# Patient Record
Sex: MALE | Race: WHITE | Employment: PART TIME | URBAN - METROPOLITAN AREA
[De-identification: names, ages, dates, MRNs, and addresses within clinical notes are randomized per-mention and may not be internally consistent; named-entity substitution may affect disease eponyms.]

---

## 2017-02-07 ENCOUNTER — OFFICE VISIT (OUTPATIENT)
Dept: URGENT CARE | Facility: CLINIC | Age: 37
End: 2017-02-07
Payer: COMMERCIAL

## 2017-02-07 VITALS
RESPIRATION RATE: 15 BRPM | BODY MASS INDEX: 39.17 KG/M2 | HEART RATE: 77 BPM | TEMPERATURE: 97.5 F | SYSTOLIC BLOOD PRESSURE: 92 MMHG | OXYGEN SATURATION: 96 % | DIASTOLIC BLOOD PRESSURE: 68 MMHG | WEIGHT: 315 LBS | HEIGHT: 75 IN

## 2017-02-07 DIAGNOSIS — J40 BRONCHITIS: Primary | ICD-10-CM

## 2017-02-07 DIAGNOSIS — J02.9 PHARYNGITIS, UNSPECIFIED ETIOLOGY: ICD-10-CM

## 2017-02-07 DIAGNOSIS — J01.40 ACUTE NON-RECURRENT PANSINUSITIS: ICD-10-CM

## 2017-02-07 DIAGNOSIS — J06.9 URI WITH COUGH AND CONGESTION: ICD-10-CM

## 2017-02-07 LAB
INT CON NEG: NEGATIVE
INT CON POS: POSITIVE
S PYO AG THROAT QL: NEGATIVE

## 2017-02-07 PROCEDURE — 87880 STREP A ASSAY W/OPTIC: CPT | Performed by: PHYSICIAN ASSISTANT

## 2017-02-07 PROCEDURE — 99214 OFFICE O/P EST MOD 30 MIN: CPT | Performed by: PHYSICIAN ASSISTANT

## 2017-02-07 RX ORDER — IBUPROFEN 200 MG
200 TABLET ORAL EVERY 6 HOURS PRN
COMMUNITY
End: 2021-03-18

## 2017-02-07 RX ORDER — PROMETHAZINE HYDROCHLORIDE AND CODEINE PHOSPHATE 6.25; 1 MG/5ML; MG/5ML
5 SYRUP ORAL 4 TIMES DAILY PRN
Qty: 240 ML | Refills: 0 | Status: SHIPPED | OUTPATIENT
Start: 2017-02-07 | End: 2017-02-21

## 2017-02-07 RX ORDER — SULFAMETHOXAZOLE AND TRIMETHOPRIM 800; 160 MG/1; MG/1
1 TABLET ORAL 2 TIMES DAILY
Qty: 20 TAB | Refills: 0 | Status: SHIPPED | OUTPATIENT
Start: 2017-02-07 | End: 2017-02-17

## 2017-02-07 ASSESSMENT — COPD QUESTIONNAIRES: COPD: 0

## 2017-02-07 ASSESSMENT — ENCOUNTER SYMPTOMS: COUGH: 1

## 2017-02-07 NOTE — PROGRESS NOTES
Subjective:      Pt is a 36 y.o. male who presents with Cough            Cough  This is a new problem. The current episode started in the past 7 days. The problem has been gradually worsening. The problem occurs constantly. The cough is productive of purulent sputum. The symptoms are aggravated by cold air, exercise and lying down. He has tried OTC cough suppressant for the symptoms. The treatment provided no relief. His past medical history is significant for bronchitis. There is no history of asthma, bronchiectasis, COPD or emphysema.   PT presents to  clinic today complaining of sore throat, fevers, chills, watery eyes, pressure in ears, cough, fatigue, runny nose, wheezing and SOB. PT denies CP, NVD, abdominal pain, joint pain. PT states these symptoms began around 2 days ago and that the pt's family has been sick on and off for the last week. Pt has not taken any medications for this condition. PT states the pain is a 7/10 with coughing, aching in nature and worse at night.  The pt's medication list, problem list, and allergies have been evaluated and reviewed during today's visit.    PMH:  Negative per pt.      PSH:  Negative per pt.      Fam Hx:  Father with hx of HTN      Soc HX:  Social History     Social History   • Marital Status:      Spouse Name: N/A   • Number of Children: N/A   • Years of Education: N/A     Occupational History   • Not on file.     Social History Main Topics   • Smoking status: Never Smoker    • Smokeless tobacco: Never Used      Comment: many years of second hand smoke exposure growing up in a smoking household.    • Alcohol Use: No   • Drug Use: No   • Sexual Activity: Not on file     Other Topics Concern   • Not on file     Social History Narrative         Medications:    Current outpatient prescriptions:   •  ibuprofen (MOTRIN) 200 MG Tab, Take 200 mg by mouth every 6 hours as needed., Disp: , Rfl:   •  vitamin D (CHOLECALCIFEROL) 1000 UNIT Tab, Take 1,000 Units by mouth  "every day., Disp: , Rfl:   •  Multiple Vitamins-Minerals (MULTI FOR HIM PO), Take  by mouth., Disp: , Rfl:   •  asa/apap/caffeine (EXCEDRIN) 250-250-65 MG Tab, Take 1 Tab by mouth every 6 hours as needed for Headache., Disp: , Rfl:   •  methylPREDNISolone (MEDROL DOSPACK) 4 MG TABS, Use as directed., Disp: 21 Tab, Rfl: 0  •  hydrocod polst-chlorphen polst (TUSSIONEX) 10-8 MG/5ML LQCR, Take 5 mL by mouth every 12 hours., Disp: 140 mL, Rfl: 1  •  azithromycin (ZITHROMAX) 250 MG TABS, Take 2 tablets PO on day one, then 1 tablet PO on day two to five., Disp: 6 Tab, Rfl: 0  •  benzonatate (TESSALON) 200 MG capsule, Take 1 Cap by mouth 3 times a day as needed for Cough., Disp: 60 Cap, Rfl: 0      Allergies:  Orange and Penicillins        Review of Systems   Respiratory: Positive for cough.    Constitutional: Positive for chills and malaise/fatigue. Negative for fever and diaphoresis.   HENT: Positive for congestion, ear pain and sore throat. Negative for ear discharge, hearing loss, nosebleeds and tinnitus.    Eyes: Negative for blurred vision, double vision and photophobia.   Respiratory continued: Positive for cough, sputum production, shortness of breath and wheezing. Negative for hemoptysis.    Cardiovascular: Negative for chest pain and palpitations.   Gastrointestinal: Negative for nausea, vomiting, abdominal pain, diarrhea and constipation.   Genitourinary: Negative for dysuria and flank pain.   Musculoskeletal: Negative for joint pain and myalgias.   Skin: Negative for itching and rash.   Neurological: Positive for headaches. Negative for dizziness, tingling and weakness.   Endo/Heme/Allergies: Does not bruise/bleed easily.   Psychiatric/Behavioral: Negative for depression. The patient is not nervous/anxious.    All other systems reviewed and are negative.           Objective:     BP 92/68 mmHg  Pulse 77  Temp(Src) 36.4 °C (97.5 °F)  Resp 15  Ht 1.905 m (6' 3\")  Wt 145.605 kg (321 lb)  BMI 40.12 kg/m2  SpO2 " 96%     Physical Exam       Physical Exam   Constitutional: PT is oriented to person, place, and time. PT appears well-developed and well-nourished. No distress.   HENT:   Head: Normocephalic and atraumatic.   Right Ear: Hearing, tympanic membrane, external ear and ear canal normal.   Left Ear: Hearing, tympanic membrane, external ear and ear canal normal.   Nose: Mucosal edema, rhinorrhea and sinus tenderness present. Right sinus exhibits frontal sinus tenderness. Left sinus exhibits frontal sinus tenderness.   Mouth/Throat: Uvula is midline. Mucous membranes are pale. Posterior oropharyngeal edema and posterior oropharyngeal erythema present. No oropharyngeal exudate.   Eyes: Conjunctivae normal and EOM are normal. Pupils are equal, round, and reactive to light. Right eye exhibits no discharge. Left eye exhibits no discharge.   Neck: Normal range of motion. Neck supple. No thyromegaly present.   Cardiovascular: Normal rate, regular rhythm, normal heart sounds and intact distal pulses.  Exam reveals no gallop and no friction rub.    No murmur heard.  Pulmonary/Chest: Effort normal. No respiratory distress. PT has wheezes. PT has no rales. PT exhibits tenderness.   Abdominal: Soft. Bowel sounds are normal. PT exhibits no distension and no mass. There is no tenderness. There is no rebound and no guarding.   Musculoskeletal: Normal range of motion. PT exhibits no edema and no tenderness.   Lymphadenopathy:     PT has no cervical adenopathy.   Neurological: Pt is alert and oriented to person, place, and time. Pt has normal reflexes. No cranial nerve deficit.   Skin: Skin is warm and dry. No rash noted. No erythema.   Psychiatric: PT has a normal mood and affect. Pt behavior is normal. Judgment and thought content normal.        Assessment/Plan:     1. Bronchitis    - sulfamethoxazole-trimethoprim (BACTRIM DS) 800-160 MG tablet; Take 1 Tab by mouth 2 times a day for 10 days.  Dispense: 20 Tab; Refill: 0    2. Acute  non-recurrent pansinusitis    - sulfamethoxazole-trimethoprim (BACTRIM DS) 800-160 MG tablet; Take 1 Tab by mouth 2 times a day for 10 days.  Dispense: 20 Tab; Refill: 0    3. Pharyngitis, unspecified etiology    - POCT Rapid Strep A-->NEG    4. URI with cough and congestion    - promethazine-codeine (PHENERGAN-CODEINE) 6.25-10 MG/5ML Syrup; Take 5 mL by mouth 4 times a day as needed for up to 14 days.  Dispense: 240 mL; Refill: 0    Rest, fluids encouraged.  OTC decongestant for congestion/cough  Note given for work.  AVS with medical info given.  Pt was in full understanding and agreement with the plan.  Follow-up as needed if symptoms worsen or fail to improve.

## 2017-02-07 NOTE — MR AVS SNAPSHOT
"        Edwardo TIM Ezequiel   2017 3:30 PM   Office Visit   MRN: [id number]    Department:  [facility]   Dept Phone:  [phone]    Description:  Male : 1980   Provider:  Goran Barlow PA-C           Reason for Visit     Cough runny nose, cough with tan/green phlegm, ears hurt, congestion, feeling of fluid in lungs, sore throat x2days      Allergies as of 2017     Allergen Noted Reactions    [place] 2014   Shortness of Breath    Penicillins 2014       Family Hx of allergy      You were diagnosed with     Bronchitis   [[id number]]  -  Primary     Acute non-recurrent pansinusitis   [[id number]]       Pharyngitis, unspecified etiology   [[id number]]       URI with cough and congestion   [[id number]]         Vital Signs     Blood Pressure Pulse Temperature Respirations Height Weight    92/68 mmHg 77 36.4 °C (97.5 °F) 15 1.905 m (6' 3\") 145.605 kg (321 lb)    Body Mass Index Oxygen Saturation Smoking Status             40.12 kg/m2 96% Never Smoker          Basic Information     Date Of Birth Sex Race Ethnicity Preferred Language    1980 Male White Unknown English      Health Maintenance        Date Due Completion Dates    IMM DTaP/Tdap/Td Vaccine (1 - Tdap) 2000 ---    IMM INFLUENZA (1) 2016 ---            Results     POCT Rapid Strep A      Component    Rapid Strep Screen    NEGATIVE    Internal Control Positive    Positive    Internal Control Negative    Negative                        Current Immunizations     No immunizations on file.      Below and/or attached are the medications your provider expects you to take. Review all of your home medications and newly ordered medications with your provider and/or pharmacist. Follow medication instructions as directed by your provider and/or pharmacist. Please keep your medication list with you and share with your provider. Update the information when medications are discontinued, doses are changed, or new medications (including " over-the-counter products) are added; and carry medication information at all times in the event of emergency situations     Allergies:  ORANGE - Shortness of Breath     PENICILLINS - (reactions not documented)               Medications  Valid as of: February 07, 2017 -  4:26 PM    Generic Name Brand Name Tablet Size Instructions for use    Aspirin-Acetaminophen-Caffeine (Tab) EXCEDRIN 250-250-65 MG Take 1 Tab by mouth every 6 hours as needed for Headache.        Azithromycin (Tab) ZITHROMAX 250 MG Take 2 tablets PO on day one, then 1 tablet PO on day two to five.        Benzonatate (Cap) TESSALON 200 MG Take 1 Cap by mouth 3 times a day as needed for Cough.        Cholecalciferol (Tab) cholecalciferol 1000 UNIT Take 1,000 Units by mouth every day.        Hydrocod Polst-Chlorphen Polst (Liquid CR) TUSSIONEX 10-8 MG/5ML Take 5 mL by mouth every 12 hours.        Ibuprofen (Tab) MOTRIN 200 MG Take 200 mg by mouth every 6 hours as needed.        MethylPREDNISolone (Tab) MEDROL DOSPACK 4 MG Use as directed.        Multiple Vitamins-Minerals   Take  by mouth.        Promethazine-Codeine (Syrup) PHENERGAN-CODEINE 6.25-10 MG/5ML Take 5 mL by mouth 4 times a day as needed for up to 14 days.        Sulfamethoxazole-Trimethoprim (Tab) BACTRIM -160 MG Take 1 Tab by mouth 2 times a day for 10 days.        .                 Medicines prescribed today were sent to:     [id number] - [place] - [address] [address]    [address] [facility] [address]    Phone: [phone] Fax: [phone]    Open 24 Hours?: No      Medication refill instructions:       If your prescription bottle indicates you have medication refills left, it is not necessary to call your provider’s office. Please contact your pharmacy and they will refill your medication.    If your prescription bottle indicates you do not have any refills left, you may request refills at any time through one of the following ways: The online [employer] system (except Urgent  Care), by calling your provider’s office, or by asking your pharmacy to contact your provider’s office with a refill request. Medication refills are processed only during regular business hours and may not be available until the next business day. Your provider may request additional information or to have a follow-up visit with you prior to refilling your medication.   *Please Note: Medication refills are assigned a new Rx number when refilled electronically. Your pharmacy may indicate that no refills were authorized even though a new prescription for the same medication is available at the pharmacy. Please request the medicine by name with the pharmacy before contacting your provider for a refill.        Instructions    Acute Bronchitis  Bronchitis is inflammation of the airways that extend from the windpipe into the lungs (bronchi). The inflammation often causes mucus to develop. This leads to a cough, which is the most common symptom of bronchitis.   In acute bronchitis, the condition usually develops suddenly and goes away over time, usually in a couple weeks. Smoking, allergies, and asthma can make bronchitis worse. Repeated episodes of bronchitis may cause further lung problems.   CAUSES  Acute bronchitis is most often caused by the same virus that causes a cold. The virus can spread from person to person (contagious) through coughing, sneezing, and touching contaminated objects.  SIGNS AND SYMPTOMS   · Cough.    · Fever.    · Coughing up mucus.    · Body aches.    · Chest congestion.    · Chills.    · Shortness of breath.    · Sore throat.    DIAGNOSIS   Acute bronchitis is usually diagnosed through a physical exam. Your health care provider will also ask you questions about your medical history. Tests, such as chest X-rays, are sometimes done to rule out other conditions.   TREATMENT   Acute bronchitis usually goes away in a couple weeks. Oftentimes, no medical treatment is necessary. Medicines are  sometimes given for relief of fever or cough. Antibiotic medicines are usually not needed but may be prescribed in certain situations. In some cases, an inhaler may be recommended to help reduce shortness of breath and control the cough. A cool mist vaporizer may also be used to help thin bronchial secretions and make it easier to clear the chest.   HOME CARE INSTRUCTIONS  · Get plenty of rest.    · Drink enough fluids to keep your urine clear or pale yellow (unless you have a medical condition that requires fluid restriction). Increasing fluids may help thin your respiratory secretions (sputum) and reduce chest congestion, and it will prevent dehydration.    · Take medicines only as directed by your health care provider.  · If you were prescribed an antibiotic medicine, finish it all even if you start to feel better.  · Avoid smoking and secondhand smoke. Exposure to cigarette smoke or irritating chemicals will make bronchitis worse. If you are a smoker, consider using nicotine gum or skin patches to help control withdrawal symptoms. Quitting smoking will help your lungs heal faster.    · Reduce the chances of another bout of acute bronchitis by washing your hands frequently, avoiding people with cold symptoms, and trying not to touch your hands to your mouth, nose, or eyes.    · Keep all follow-up visits as directed by your health care provider.    SEEK MEDICAL CARE IF:  Your symptoms do not improve after 1 week of treatment.   SEEK IMMEDIATE MEDICAL CARE IF:  · You develop an increased fever or chills.    · You have chest pain.    · You have severe shortness of breath.  · You have bloody sputum.    · You develop dehydration.  · You faint or repeatedly feel like you are going to pass out.  · You develop repeated vomiting.  · You develop a severe headache.  MAKE SURE YOU:   · Understand these instructions.  · Will watch your condition.  · Will get help right away if you are not doing well or get worse.     This  information is not intended to replace advice given to you by your health care provider. Make sure you discuss any questions you have with your health care provider.     Document Released: 01/25/2006 Document Revised: 01/08/2016 Document Reviewed: 06/10/2014  [employer] Interactive Patient Education ©2016 [employer] Inc.            mVisum Access Code: [email or web address]  Expires: 3/9/2017  3:50 PM    Your email address is not on file at [facility].  Email Addresses are required for you to sign up for mVisum, please contact [phone] to verify your personal information and to provide your email address prior to attempting to register for mVisum.    Edwardo Nieves  [address] [place] [address]    mVisum  A secure, online tool to manage your health information     [facility]’s mVisum® is a secure, online tool that connects you to your personalized health information from the privacy of your home -- day or night - making it very easy for you to manage your healthcare. Once the activation process is completed, you can even access your medical information using the mVisum flora, which is available for free in the Apple Flora store or Google Play store.     To learn more about mVisum, visit [email or web address]    There are two levels of access available (as shown below):   My Chart Features  [facility] Primary Care Doctor [facility]  Specialists [facility]  Urgent  Care [facility] Primary Care Doctor   Email your healthcare team securely and privately 24/7 X X X    Manage appointments: schedule your next appointment; view details of past/upcoming appointments X      Request prescription refills. X      View recent personal medical records, including lab and immunizations X X X X   View health record, including health history, allergies, medications X X X X   Read reports about your outpatient visits, procedures, consult and ER notes X X X X   See your discharge summary, which is a recap of your hospital and/or  ER visit that includes your diagnosis, lab results, and care plan X X  X     How to register for [employer]:  Once your e-mail address has been verified, follow the following steps to sign up for [employer].     1. Go to  [email or web address]  2. Click on the Sign Up Now box, which takes you to the New Member Sign Up page. You will need to provide the following information:  a. Enter your [employer] Access Code exactly as it appears at the top of this page. (You will not need to use this code after you’ve completed the sign-up process. If you do not sign up before the expiration date, you must request a new code.)   b. Enter your date of birth.   c. Enter your home email address.   d. Click Submit, and follow the next screen’s instructions.  3. Create a [employer] ID. This will be your [employer] login ID and cannot be changed, so think of one that is secure and easy to remember.  4. Create a [employer] password. You can change your password at any time.  5. Enter your Password Reset Question and Answer. This can be used at a later time if you forget your password.   6. Enter your e-mail address. This allows you to receive e-mail notifications when new information is available in [employer].  7. Click Sign Up. You can now view your health information.    For assistance activating your [employer] account, call [phone]

## 2017-02-07 NOTE — Clinical Note
February 7, 2017       Patient: Edwardo Nieves   YOB: 1980   Date of Visit: 2/7/2017         To Whom It May Concern:    It is my medical opinion that Edwardo Nieves may be excused from work for the dates of 2/7/17-2/9/17.      If you have any questions or concerns, please don't hesitate to call [phone]          Sincerely,          Goran Barlow PA-C  Electronically Signed

## 2017-02-07 NOTE — PATIENT INSTRUCTIONS
Acute Bronchitis  Bronchitis is inflammation of the airways that extend from the windpipe into the lungs (bronchi). The inflammation often causes mucus to develop. This leads to a cough, which is the most common symptom of bronchitis.   In acute bronchitis, the condition usually develops suddenly and goes away over time, usually in a couple weeks. Smoking, allergies, and asthma can make bronchitis worse. Repeated episodes of bronchitis may cause further lung problems.   CAUSES  Acute bronchitis is most often caused by the same virus that causes a cold. The virus can spread from person to person (contagious) through coughing, sneezing, and touching contaminated objects.  SIGNS AND SYMPTOMS   · Cough.    · Fever.    · Coughing up mucus.    · Body aches.    · Chest congestion.    · Chills.    · Shortness of breath.    · Sore throat.    DIAGNOSIS   Acute bronchitis is usually diagnosed through a physical exam. Your health care provider will also ask you questions about your medical history. Tests, such as chest X-rays, are sometimes done to rule out other conditions.   TREATMENT   Acute bronchitis usually goes away in a couple weeks. Oftentimes, no medical treatment is necessary. Medicines are sometimes given for relief of fever or cough. Antibiotic medicines are usually not needed but may be prescribed in certain situations. In some cases, an inhaler may be recommended to help reduce shortness of breath and control the cough. A cool mist vaporizer may also be used to help thin bronchial secretions and make it easier to clear the chest.   HOME CARE INSTRUCTIONS  · Get plenty of rest.    · Drink enough fluids to keep your urine clear or pale yellow (unless you have a medical condition that requires fluid restriction). Increasing fluids may help thin your respiratory secretions (sputum) and reduce chest congestion, and it will prevent dehydration.    · Take medicines only as directed by your health care provider.  · If  you were prescribed an antibiotic medicine, finish it all even if you start to feel better.  · Avoid smoking and secondhand smoke. Exposure to cigarette smoke or irritating chemicals will make bronchitis worse. If you are a smoker, consider using nicotine gum or skin patches to help control withdrawal symptoms. Quitting smoking will help your lungs heal faster.    · Reduce the chances of another bout of acute bronchitis by washing your hands frequently, avoiding people with cold symptoms, and trying not to touch your hands to your mouth, nose, or eyes.    · Keep all follow-up visits as directed by your health care provider.    SEEK MEDICAL CARE IF:  Your symptoms do not improve after 1 week of treatment.   SEEK IMMEDIATE MEDICAL CARE IF:  · You develop an increased fever or chills.    · You have chest pain.    · You have severe shortness of breath.  · You have bloody sputum.    · You develop dehydration.  · You faint or repeatedly feel like you are going to pass out.  · You develop repeated vomiting.  · You develop a severe headache.  MAKE SURE YOU:   · Understand these instructions.  · Will watch your condition.  · Will get help right away if you are not doing well or get worse.     This information is not intended to replace advice given to you by your health care provider. Make sure you discuss any questions you have with your health care provider.     Document Released: 01/25/2006 Document Revised: 01/08/2016 Document Reviewed: 06/10/2014  [employer] Interactive Patient Education ©2016 [employer]

## 2018-04-26 ENCOUNTER — OFFICE VISIT (OUTPATIENT)
Dept: URGENT CARE | Facility: CLINIC | Age: 38
End: 2018-04-26
Payer: COMMERCIAL

## 2018-04-26 VITALS
SYSTOLIC BLOOD PRESSURE: 100 MMHG | HEART RATE: 81 BPM | HEIGHT: 75 IN | TEMPERATURE: 98.6 F | OXYGEN SATURATION: 98 % | BODY MASS INDEX: 39.17 KG/M2 | RESPIRATION RATE: 15 BRPM | WEIGHT: 315 LBS | DIASTOLIC BLOOD PRESSURE: 82 MMHG

## 2018-04-26 DIAGNOSIS — J02.9 VIRAL PHARYNGITIS: ICD-10-CM

## 2018-04-26 DIAGNOSIS — G43.809 OTHER MIGRAINE WITHOUT STATUS MIGRAINOSUS, NOT INTRACTABLE: ICD-10-CM

## 2018-04-26 DIAGNOSIS — J02.9 SORE THROAT: ICD-10-CM

## 2018-04-26 LAB
INT CON NEG: NORMAL
INT CON POS: NORMAL
S PYO AG THROAT QL: NEGATIVE

## 2018-04-26 PROCEDURE — 99214 OFFICE O/P EST MOD 30 MIN: CPT | Performed by: NURSE PRACTITIONER

## 2018-04-26 PROCEDURE — 87880 STREP A ASSAY W/OPTIC: CPT | Performed by: NURSE PRACTITIONER

## 2018-04-26 PROCEDURE — 99999 PR NO CHARGE: CPT | Performed by: NURSE PRACTITIONER

## 2018-04-26 RX ORDER — DIPHENHYDRAMINE HYDROCHLORIDE 50 MG/ML
25 INJECTION INTRAMUSCULAR; INTRAVENOUS ONCE
Status: COMPLETED | OUTPATIENT
Start: 2018-04-26 | End: 2018-04-26

## 2018-04-26 RX ORDER — PROMETHAZINE HYDROCHLORIDE 25 MG/1
25 TABLET ORAL ONCE
Status: COMPLETED | OUTPATIENT
Start: 2018-04-26 | End: 2018-04-26

## 2018-04-26 RX ORDER — KETOROLAC TROMETHAMINE 30 MG/ML
60 INJECTION, SOLUTION INTRAMUSCULAR; INTRAVENOUS ONCE
Status: COMPLETED | OUTPATIENT
Start: 2018-04-26 | End: 2018-04-26

## 2018-04-26 RX ADMIN — PROMETHAZINE HYDROCHLORIDE 25 MG: 25 TABLET ORAL at 10:34

## 2018-04-26 RX ADMIN — KETOROLAC TROMETHAMINE 60 MG: 30 INJECTION, SOLUTION INTRAMUSCULAR; INTRAVENOUS at 10:40

## 2018-04-26 RX ADMIN — DIPHENHYDRAMINE HYDROCHLORIDE 25 MG: 50 INJECTION INTRAMUSCULAR; INTRAVENOUS at 10:42

## 2018-04-26 ASSESSMENT — ENCOUNTER SYMPTOMS
SWOLLEN GLANDS: 1
BLURRED VISION: 0
EYE REDNESS: 0
EYE DISCHARGE: 0
EYE PAIN: 1
COUGH: 0
MIGRAINE HEADACHES: 1
FACIAL SWEATING: 0
SCALP TENDERNESS: 0
EYE WATERING: 0
FEVER: 0
HEADACHES: 1
DOUBLE VISION: 0
SORE THROAT: 1
PHOTOPHOBIA: 1
SINUS PRESSURE: 0

## 2018-04-26 ASSESSMENT — PATIENT HEALTH QUESTIONNAIRE - PHQ9
CLINICAL INTERPRETATION OF PHQ2 SCORE: 4
SUM OF ALL RESPONSES TO PHQ QUESTIONS 1-9: 6
5. POOR APPETITE OR OVEREATING: 0 - NOT AT ALL

## 2018-04-26 ASSESSMENT — VISUAL ACUITY
OD_CC: 20/20
OS_CC: 20/20

## 2018-04-26 NOTE — LETTER
April 26, 2018         Patient: Edwardo Nieves   YOB: 1980   Date of Visit: 4/26/2018           To Whom it May Concern:    Edwardo Nieves was seen in my clinic on 4/26/2018. Please excuse him from school 4/26/18-4/27/18.        Sincerely,           EROS Strange.  Electronically Signed

## 2018-04-26 NOTE — PROGRESS NOTES
Subjective:      Edwardo Nieves is a 37 y.o. male who presents with Migraine (sensative to light, not able to speak, swollen lymph nodes, pressure on right,right eye and ear, sore throat, fever, x3days)            Migraine    This is a new problem. Episode onset: Pt presents today with c/o sore throat, right ear pain and migraine that developed 3 days ago. Rates throat pain 5/10, right eye and ear pain 7/10. He feels his vision is blurry. Denies fever, aches or chills. The problem has been unchanged. The pain is located in the right unilateral region. Similar to prior headaches: He has had headaches previously but this is the first time it is mainly on the right side. The quality of the pain is described as aching and shooting. The pain is moderate. Associated symptoms include ear pain (right), eye pain (right), photophobia, a sore throat and swollen glands. Pertinent negatives include no blurred vision, coughing, eye redness, eye watering, facial sweating, fever, muscle aches, phonophobia, scalp tenderness, sinus pressure or tinnitus. Treatments tried: pt reports he took one excedrin yesterday and throat lozenges throughout the day. The treatment provided no relief. His past medical history is significant for migraine headaches.       Review of Systems   Constitutional: Negative for fever and malaise/fatigue.   HENT: Positive for ear pain (right) and sore throat. Negative for congestion, sinus pressure and tinnitus.    Eyes: Positive for photophobia and pain (right). Negative for blurred vision, double vision, discharge and redness.   Respiratory: Negative for cough.    Neurological: Positive for headaches.   All other systems reviewed and are negative.    No past medical history on file. No past surgical history on file.   Social History     Social History   • Marital status:      Spouse name: N/A   • Number of children: N/A   • Years of education: N/A     Occupational History   • Not on file.     Social  "History Main Topics   • Smoking status: Never Smoker   • Smokeless tobacco: Never Used      Comment: many years of second hand smoke exposure growing up in a smoking household.    • Alcohol use No   • Drug use: No   • Sexual activity: Not on file     Other Topics Concern   • Not on file     Social History Narrative   • No narrative on file          Objective:     /82   Pulse 81   Temp 37 °C (98.6 °F)   Resp 15   Ht 1.905 m (6' 3\")   Wt (!) 147.4 kg (325 lb)   SpO2 98%   BMI 40.62 kg/m²      Physical Exam   Constitutional: He is oriented to person, place, and time. Vital signs are normal. He appears well-developed and well-nourished.   HENT:   Head: Normocephalic and atraumatic.   Right Ear: Tympanic membrane and external ear normal.   Left Ear: Tympanic membrane and external ear normal.   Nose: Nose normal.   Mouth/Throat: Posterior oropharyngeal erythema present. No oropharyngeal exudate or posterior oropharyngeal edema.   Eyes: Conjunctivae and EOM are normal. Pupils are equal, round, and reactive to light.   Visual acuity with glasses-   R- 20/20  L- 20/20  Both- 20/20   Neck: Normal range of motion.   Cardiovascular: Normal rate and regular rhythm.    Pulmonary/Chest: Effort normal.   Musculoskeletal: Normal range of motion.   Lymphadenopathy:        Head (right side): Submandibular adenopathy present.        Head (left side): Submandibular adenopathy present.   Neurological: He is alert and oriented to person, place, and time.   Skin: Skin is warm and dry. Capillary refill takes less than 2 seconds.   Psychiatric: He has a normal mood and affect. His speech is normal and behavior is normal. Thought content normal.   Vitals reviewed.              Assessment/Plan:     1. Sore throat  - POCT Rapid Strep A NEGATIVE    2. Viral pharyngitis  - mag hydrox-al hydrox-simeth-diphenhydrAMINE-lidocaine viscous 2%; Take 15 mL by mouth every 6 hours as needed.  Dispense: 300 mL; Refill: 0    3. Other migraine " without status migrainosus, not intractable  - ketorolac (TORADOL) injection 60 mg; 2 mL by Intramuscular route Once.  - diphenhydrAMINE (BENADRYL) injection 25 mg; 0.5 mL by Intramuscular route Once.  - promethazine (PHENERGAN) tablet 25 mg; Take 1 Tab by mouth Once.    Pt presents with his mother and he is writing on a notepad to communicate with me. He does not appear to be in any distress, his visual acuity is WNL and he is alert and oriented.  Given that he has taken very few OTC medications to help improve these symptoms, I have encouraged him to alternate tylenol and ibuprofen for headache and throat pain  Warm salt water gargles  Throat lozenges as directed  He wants a school note to excuse him for today and tomorrow, I will provide this   Both mother and patient understand POC and agree  If his visual complaints worsen or new worsening pain to right eye he needs to go to the ED, he understands  At this time I am not concerned for any acute IC abnormalities that would cause pain or affect his eye  RTC if ST not improving  Supportive care, differential diagnoses, and indications for immediate follow-up discussed with patient.    Pathogenesis of diagnosis discussed including typical length and natural progression.      Instructed to return to  or nearest emergency department if symptoms fail to improve, for any change in condition, further concerns, or new concerning symptoms.  Patient states understanding of the plan of care and discharge instructions.

## 2018-09-19 ENCOUNTER — OFFICE VISIT (OUTPATIENT)
Dept: MEDICAL GROUP | Facility: PHYSICIAN GROUP | Age: 38
End: 2018-09-19
Payer: COMMERCIAL

## 2018-09-19 VITALS
OXYGEN SATURATION: 98 % | TEMPERATURE: 97.8 F | WEIGHT: 302 LBS | DIASTOLIC BLOOD PRESSURE: 60 MMHG | BODY MASS INDEX: 37.55 KG/M2 | SYSTOLIC BLOOD PRESSURE: 100 MMHG | HEIGHT: 75 IN | HEART RATE: 58 BPM

## 2018-09-19 DIAGNOSIS — E66.9 OBESITY (BMI 35.0-39.9 WITHOUT COMORBIDITY): ICD-10-CM

## 2018-09-19 DIAGNOSIS — Z00.00 WELL ADULT EXAM: ICD-10-CM

## 2018-09-19 DIAGNOSIS — Z23 NEED FOR INFLUENZA VACCINATION: ICD-10-CM

## 2018-09-19 PROCEDURE — 90471 IMMUNIZATION ADMIN: CPT | Performed by: FAMILY MEDICINE

## 2018-09-19 PROCEDURE — 90686 IIV4 VACC NO PRSV 0.5 ML IM: CPT | Performed by: FAMILY MEDICINE

## 2018-09-19 PROCEDURE — 99385 PREV VISIT NEW AGE 18-39: CPT | Mod: 25 | Performed by: FAMILY MEDICINE

## 2018-09-19 ASSESSMENT — ENCOUNTER SYMPTOMS
CHILLS: 0
RESPIRATORY NEGATIVE: 1
GASTROINTESTINAL NEGATIVE: 1
DEPRESSION: 0
HEMOPTYSIS: 0
MYALGIAS: 0
DIZZINESS: 0
COUGH: 0
HEADACHES: 0
FEVER: 0
PALPITATIONS: 0
BLURRED VISION: 0
HEARTBURN: 0
CONSTITUTIONAL NEGATIVE: 1
CARDIOVASCULAR NEGATIVE: 1
NAUSEA: 0
PSYCHIATRIC NEGATIVE: 1
MUSCULOSKELETAL NEGATIVE: 1
BRUISES/BLEEDS EASILY: 0
TINGLING: 0
NEUROLOGICAL NEGATIVE: 1
DOUBLE VISION: 0
EYES NEGATIVE: 1

## 2018-09-19 NOTE — PROGRESS NOTES
Subjective:      Edwardo Nieves is a 38 y.o. male who presents with Annual Exam            New patient visit, need flu for HM   Still needs to lose weight but has made great progress down from 370 a year ago  Advised on diet and exercise    1. Well adult exam      2. Need for influenza vaccination    - Flu Quad Inj >3 Year Pre-Filled (Preservative Free)    3. Obesity (BMI 35.0-39.9 without comorbidity)    - Patient identified as having weight management issue.  Appropriate orders and counseling given.    History reviewed. No pertinent past medical history.  History reviewed. No pertinent surgical history.  Smoking status: Never Smoker                                                              Smokeless tobacco: Never Used                      Comment: many years of second hand smoke exposure            growing up in a smoking household.   Alcohol use: No              Review of patient's family history indicates:  Problem: Cancer      Relation: Mother       Age of Onset: (Not Specified)       Current Outpatient Prescriptions: •  mag hydrox-al hydrox-simeth-diphenhydrAMINE-lidocaine viscous 2%, Take 15 mL by mouth every 6 hours as needed., Disp: 300 mL, Rfl: 0•  ibuprofen (MOTRIN) 200 MG Tab, Take 200 mg by mouth every 6 hours as needed., Disp: , Rfl: •  vitamin D (CHOLECALCIFEROL) 1000 UNIT Tab, Take 1,000 Units by mouth every day., Disp: , Rfl: •  asa/apap/caffeine (EXCEDRIN) 250-250-65 MG Tab, Take 1 Tab by mouth every 6 hours as needed for Headache., Disp: , Rfl: •  Multiple Vitamins-Minerals (MULTI FOR HIM PO), Take  by mouth., Disp: , Rfl: •  methylPREDNISolone (MEDROL DOSPACK) 4 MG TABS, Use as directed., Disp: 21 Tab, Rfl: 0•  hydrocod polst-chlorphen polst (TUSSIONEX) 10-8 MG/5ML LQCR, Take 5 mL by mouth every 12 hours., Disp: 140 mL, Rfl: 1•  azithromycin (ZITHROMAX) 250 MG TABS, Take 2 tablets PO on day one, then 1 tablet PO on day two to five., Disp: 6 Tab, Rfl: 0•  benzonatate (TESSALON) 200 MG capsule,  "Take 1 Cap by mouth 3 times a day as needed for Cough., Disp: 60 Cap, Rfl: 0    Patient was instructed on the use of medications, either prescriptions or OTC and informed on when the appropriate follow up time period should be. In addition, patient was also instructed that should any acute worsening occur that they should notify this clinic asap or call 911.            Review of Systems   Constitutional: Negative.  Negative for chills and fever.   HENT: Negative.  Negative for hearing loss.    Eyes: Negative.  Negative for blurred vision and double vision.   Respiratory: Negative.  Negative for cough and hemoptysis.    Cardiovascular: Negative.  Negative for chest pain and palpitations.   Gastrointestinal: Negative.  Negative for heartburn and nausea.   Genitourinary: Negative.  Negative for dysuria.   Musculoskeletal: Negative.  Negative for myalgias.   Skin: Negative.  Negative for rash.   Neurological: Negative.  Negative for dizziness, tingling and headaches.   Endo/Heme/Allergies: Negative.  Does not bruise/bleed easily.   Psychiatric/Behavioral: Negative.  Negative for depression and suicidal ideas.   All other systems reviewed and are negative.         Objective:     /60 (BP Location: Left arm, Patient Position: Sitting)   Pulse (!) 58   Temp 36.6 °C (97.8 °F)   Ht 1.905 m (6' 3\")   Wt (!) 137 kg (302 lb)   SpO2 98%   BMI 37.75 kg/m²      Physical Exam   Constitutional: He is oriented to person, place, and time. He appears well-developed and well-nourished. No distress.   HENT:   Head: Normocephalic and atraumatic.   Mouth/Throat: Oropharynx is clear and moist. No oropharyngeal exudate.   Eyes: Pupils are equal, round, and reactive to light.   Cardiovascular: Normal rate, regular rhythm, normal heart sounds and intact distal pulses.  Exam reveals no gallop and no friction rub.    No murmur heard.  Pulmonary/Chest: Effort normal and breath sounds normal. No respiratory distress. He has no wheezes. " He has no rales. He exhibits no tenderness.   Neurological: He is alert and oriented to person, place, and time.   Skin: He is not diaphoretic.   Psychiatric: He has a normal mood and affect. His behavior is normal. Judgment and thought content normal.   Nursing note and vitals reviewed.              Assessment/Plan:     1. Well adult exam      2. Need for influenza vaccination    - Flu Quad Inj >3 Year Pre-Filled (Preservative Free)    3. Obesity (BMI 35.0-39.9 without comorbidity)    - Patient identified as having weight management issue.  Appropriate orders and counseling given.

## 2018-10-11 ENCOUNTER — OFFICE VISIT (OUTPATIENT)
Dept: MEDICAL GROUP | Facility: PHYSICIAN GROUP | Age: 38
End: 2018-10-11
Payer: COMMERCIAL

## 2018-10-11 VITALS
SYSTOLIC BLOOD PRESSURE: 104 MMHG | TEMPERATURE: 97.7 F | OXYGEN SATURATION: 96 % | HEART RATE: 88 BPM | DIASTOLIC BLOOD PRESSURE: 62 MMHG | WEIGHT: 315 LBS | BODY MASS INDEX: 39.17 KG/M2 | HEIGHT: 75 IN

## 2018-10-11 DIAGNOSIS — Z00.00 WELL ADULT EXAM: ICD-10-CM

## 2018-10-11 PROCEDURE — 99395 PREV VISIT EST AGE 18-39: CPT | Performed by: FAMILY MEDICINE

## 2018-10-11 ASSESSMENT — ENCOUNTER SYMPTOMS
DOUBLE VISION: 0
TINGLING: 0
CONSTITUTIONAL NEGATIVE: 1
BLURRED VISION: 0
FEVER: 0
COUGH: 0
BRUISES/BLEEDS EASILY: 0
MUSCULOSKELETAL NEGATIVE: 1
CHILLS: 0
DEPRESSION: 0
NAUSEA: 0
GASTROINTESTINAL NEGATIVE: 1
HEMOPTYSIS: 0
HEADACHES: 0
EYES NEGATIVE: 1
PSYCHIATRIC NEGATIVE: 1
HEARTBURN: 0
RESPIRATORY NEGATIVE: 1
MYALGIAS: 0
NEUROLOGICAL NEGATIVE: 1
PALPITATIONS: 0
CARDIOVASCULAR NEGATIVE: 1
DIZZINESS: 0

## 2018-10-11 NOTE — PROGRESS NOTES
Subjective:      Edwardo Nieves is a 38 y.o. male who presents with Annual Exam            Well adult, needs labs for HM form from work    1. Well adult exam    - COMP METABOLIC PANEL; Future  - LIPID PROFILE; Future  - HEMOGLOBIN A1C; Future    No past medical history on file.  No past surgical history on file.  Smoking status: Never Smoker                                                              Smokeless tobacco: Never Used                      Comment: many years of second hand smoke exposure            growing up in a smoking household.   Alcohol use: No              Review of patient's family history indicates:  Problem: Cancer      Relation: Mother       Age of Onset: (Not Specified)       Current Outpatient Prescriptions: •  mag hydrox-al hydrox-simeth-diphenhydrAMINE-lidocaine viscous 2%, Take 15 mL by mouth every 6 hours as needed., Disp: 300 mL, Rfl: 0•  ibuprofen (MOTRIN) 200 MG Tab, Take 200 mg by mouth every 6 hours as needed., Disp: , Rfl: •  vitamin D (CHOLECALCIFEROL) 1000 UNIT Tab, Take 1,000 Units by mouth every day., Disp: , Rfl: •  asa/apap/caffeine (EXCEDRIN) 250-250-65 MG Tab, Take 1 Tab by mouth every 6 hours as needed for Headache., Disp: , Rfl: •  Multiple Vitamins-Minerals (MULTI FOR HIM PO), Take  by mouth., Disp: , Rfl: •  methylPREDNISolone (MEDROL DOSPACK) 4 MG TABS, Use as directed., Disp: 21 Tab, Rfl: 0•  hydrocod polst-chlorphen polst (TUSSIONEX) 10-8 MG/5ML LQCR, Take 5 mL by mouth every 12 hours., Disp: 140 mL, Rfl: 1•  azithromycin (ZITHROMAX) 250 MG TABS, Take 2 tablets PO on day one, then 1 tablet PO on day two to five., Disp: 6 Tab, Rfl: 0•  benzonatate (TESSALON) 200 MG capsule, Take 1 Cap by mouth 3 times a day as needed for Cough., Disp: 60 Cap, Rfl: 0    Patient was instructed on the use of medications, either prescriptions or OTC and informed on when the appropriate follow up time period should be. In addition, patient was also instructed that should any acute  "worsening occur that they should notify this clinic asap or call 911.            Review of Systems   Constitutional: Negative.  Negative for chills and fever.   HENT: Negative.  Negative for hearing loss.    Eyes: Negative.  Negative for blurred vision and double vision.   Respiratory: Negative.  Negative for cough and hemoptysis.    Cardiovascular: Negative.  Negative for chest pain and palpitations.   Gastrointestinal: Negative.  Negative for heartburn and nausea.   Genitourinary: Negative.  Negative for dysuria.   Musculoskeletal: Negative.  Negative for myalgias.   Skin: Negative.  Negative for rash.   Neurological: Negative.  Negative for dizziness, tingling and headaches.   Endo/Heme/Allergies: Negative.  Does not bruise/bleed easily.   Psychiatric/Behavioral: Negative.  Negative for depression and suicidal ideas.   All other systems reviewed and are negative.         Objective:     /62 (BP Location: Right arm, Patient Position: Sitting)   Pulse 88   Temp 36.5 °C (97.7 °F)   Ht 1.905 m (6' 3\")   Wt (!) 147 kg (324 lb)   SpO2 96%   BMI 40.50 kg/m²      Physical Exam   Constitutional: He is oriented to person, place, and time. He appears well-developed and well-nourished. No distress.   HENT:   Head: Normocephalic and atraumatic.   Mouth/Throat: Oropharynx is clear and moist. No oropharyngeal exudate.   Eyes: Pupils are equal, round, and reactive to light.   Cardiovascular: Normal rate, regular rhythm, normal heart sounds and intact distal pulses.  Exam reveals no gallop and no friction rub.    No murmur heard.  Pulmonary/Chest: Effort normal and breath sounds normal. No respiratory distress. He has no wheezes. He has no rales. He exhibits no tenderness.   Neurological: He is alert and oriented to person, place, and time.   Skin: He is not diaphoretic.   Psychiatric: He has a normal mood and affect. His behavior is normal. Judgment and thought content normal.   Nursing note and vitals reviewed.       "        Assessment/Plan:     1. Well adult exam    - COMP METABOLIC PANEL; Future  - LIPID PROFILE; Future  - HEMOGLOBIN A1C; Future

## 2018-10-12 ENCOUNTER — TELEPHONE (OUTPATIENT)
Dept: MEDICAL GROUP | Facility: PHYSICIAN GROUP | Age: 38
End: 2018-10-12

## 2018-10-12 NOTE — TELEPHONE ENCOUNTER
Patient cam in with a wellness form for his work. He needed to get his labs done then I will be able too finish the form. The form is on MA desk.

## 2018-10-15 ENCOUNTER — HOSPITAL ENCOUNTER (OUTPATIENT)
Dept: LAB | Facility: MEDICAL CENTER | Age: 38
End: 2018-10-15
Attending: FAMILY MEDICINE
Payer: COMMERCIAL

## 2018-10-15 DIAGNOSIS — Z00.00 WELL ADULT EXAM: ICD-10-CM

## 2018-10-15 PROCEDURE — 36415 COLL VENOUS BLD VENIPUNCTURE: CPT

## 2018-10-15 PROCEDURE — 80061 LIPID PANEL: CPT

## 2018-10-15 PROCEDURE — 80053 COMPREHEN METABOLIC PANEL: CPT

## 2018-10-15 PROCEDURE — 83036 HEMOGLOBIN GLYCOSYLATED A1C: CPT

## 2018-10-16 LAB
ALBUMIN SERPL BCP-MCNC: 4.3 G/DL (ref 3.2–4.9)
ALBUMIN/GLOB SERPL: 1.4 G/DL
ALP SERPL-CCNC: 56 U/L (ref 30–99)
ALT SERPL-CCNC: 26 U/L (ref 2–50)
ANION GAP SERPL CALC-SCNC: 7 MMOL/L (ref 0–11.9)
AST SERPL-CCNC: 27 U/L (ref 12–45)
BILIRUB SERPL-MCNC: 0.6 MG/DL (ref 0.1–1.5)
BUN SERPL-MCNC: 25 MG/DL (ref 8–22)
CALCIUM SERPL-MCNC: 9.3 MG/DL (ref 8.5–10.5)
CHLORIDE SERPL-SCNC: 109 MMOL/L (ref 96–112)
CHOLEST SERPL-MCNC: 153 MG/DL (ref 100–199)
CO2 SERPL-SCNC: 25 MMOL/L (ref 20–33)
CREAT SERPL-MCNC: 1.04 MG/DL (ref 0.5–1.4)
EST. AVERAGE GLUCOSE BLD GHB EST-MCNC: 126 MG/DL
FASTING STATUS PATIENT QL REPORTED: NORMAL
GLOBULIN SER CALC-MCNC: 3 G/DL (ref 1.9–3.5)
GLUCOSE SERPL-MCNC: 107 MG/DL (ref 65–99)
HBA1C MFR BLD: 6 % (ref 0–5.6)
HDLC SERPL-MCNC: 41 MG/DL
LDLC SERPL CALC-MCNC: 84 MG/DL
POTASSIUM SERPL-SCNC: 4.5 MMOL/L (ref 3.6–5.5)
PROT SERPL-MCNC: 7.3 G/DL (ref 6–8.2)
SODIUM SERPL-SCNC: 141 MMOL/L (ref 135–145)
TRIGL SERPL-MCNC: 139 MG/DL (ref 0–149)

## 2019-05-07 ENCOUNTER — OFFICE VISIT (OUTPATIENT)
Dept: URGENT CARE | Facility: CLINIC | Age: 39
End: 2019-05-07
Payer: COMMERCIAL

## 2019-05-07 ENCOUNTER — HOSPITAL ENCOUNTER (EMERGENCY)
Facility: MEDICAL CENTER | Age: 39
End: 2019-05-07
Attending: EMERGENCY MEDICINE
Payer: COMMERCIAL

## 2019-05-07 ENCOUNTER — APPOINTMENT (OUTPATIENT)
Dept: RADIOLOGY | Facility: MEDICAL CENTER | Age: 39
End: 2019-05-07
Attending: EMERGENCY MEDICINE
Payer: COMMERCIAL

## 2019-05-07 VITALS
WEIGHT: 315 LBS | SYSTOLIC BLOOD PRESSURE: 120 MMHG | RESPIRATION RATE: 20 BRPM | TEMPERATURE: 97.7 F | HEART RATE: 76 BPM | HEIGHT: 75 IN | BODY MASS INDEX: 39.17 KG/M2 | DIASTOLIC BLOOD PRESSURE: 70 MMHG | OXYGEN SATURATION: 95 %

## 2019-05-07 VITALS
SYSTOLIC BLOOD PRESSURE: 119 MMHG | TEMPERATURE: 97.6 F | BODY MASS INDEX: 39.17 KG/M2 | WEIGHT: 315 LBS | HEIGHT: 75 IN | DIASTOLIC BLOOD PRESSURE: 78 MMHG | HEART RATE: 57 BPM | RESPIRATION RATE: 16 BRPM
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
## 2019-05-07 DIAGNOSIS — M54.2 NECK PAIN: ICD-10-CM

## 2019-05-07 DIAGNOSIS — M25.522 LEFT ELBOW PAIN: ICD-10-CM

## 2019-05-07 PROCEDURE — 700102 HCHG RX REV CODE 250 W/ 637 OVERRIDE(OP): Performed by: EMERGENCY MEDICINE

## 2019-05-07 PROCEDURE — A9270 NON-COVERED ITEM OR SERVICE: HCPCS | Performed by: EMERGENCY MEDICINE

## 2019-05-07 PROCEDURE — 99214 OFFICE O/P EST MOD 30 MIN: CPT | Performed by: PHYSICIAN ASSISTANT

## 2019-05-07 PROCEDURE — 73080 X-RAY EXAM OF ELBOW: CPT | Mod: LT

## 2019-05-07 PROCEDURE — 99284 EMERGENCY DEPT VISIT MOD MDM: CPT

## 2019-05-07 RX ORDER — IBUPROFEN 600 MG/1
600 TABLET ORAL ONCE
Status: COMPLETED | OUTPATIENT
Start: 2019-05-07 | End: 2019-05-07

## 2019-05-07 RX ADMIN — IBUPROFEN 600 MG: 600 TABLET ORAL at 19:30

## 2019-05-07 ASSESSMENT — ENCOUNTER SYMPTOMS
NECK PAIN: 1
TINGLING: 0
FOCAL WEAKNESS: 0
SENSORY CHANGE: 0
HEADACHES: 0
LOSS OF CONSCIOUSNESS: 0
BLURRED VISION: 0
DOUBLE VISION: 0
VOMITING: 0
NAUSEA: 0
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
## 2019-05-15 ENCOUNTER — OFFICE VISIT (OUTPATIENT)
Dept: MEDICAL GROUP | Facility: PHYSICIAN GROUP | Age: 39
End: 2019-05-15
Payer: COMMERCIAL

## 2019-05-15 VITALS
WEIGHT: 310 LBS | TEMPERATURE: 96.9 F | OXYGEN SATURATION: 97 % | SYSTOLIC BLOOD PRESSURE: 102 MMHG | BODY MASS INDEX: 38.54 KG/M2 | HEART RATE: 91 BPM | HEIGHT: 75 IN | DIASTOLIC BLOOD PRESSURE: 60 MMHG | RESPIRATION RATE: 12 BRPM

## 2019-05-15 DIAGNOSIS — E66.9 OBESITY (BMI 35.0-39.9 WITHOUT COMORBIDITY): ICD-10-CM

## 2019-05-15 DIAGNOSIS — S16.1XXD STRAIN OF NECK MUSCLE, SUBSEQUENT ENCOUNTER: ICD-10-CM

## 2019-05-15 DIAGNOSIS — Z00.00 WELL ADULT EXAM: ICD-10-CM

## 2019-05-15 PROCEDURE — 99395 PREV VISIT EST AGE 18-39: CPT | Performed by: FAMILY MEDICINE

## 2019-05-15 ASSESSMENT — PATIENT HEALTH QUESTIONNAIRE - PHQ9: CLINICAL INTERPRETATION OF PHQ2 SCORE: 0

## 2019-05-15 ASSESSMENT — ENCOUNTER SYMPTOMS
COUGH: 0
NAUSEA: 0
PALPITATIONS: 0
NECK PAIN: 1
HEMOPTYSIS: 0
DIZZINESS: 0
HEADACHES: 0
FEVER: 0
BRUISES/BLEEDS EASILY: 0
DEPRESSION: 0
HEARTBURN: 0
DOUBLE VISION: 0
NEUROLOGICAL NEGATIVE: 1
RESPIRATORY NEGATIVE: 1
MYALGIAS: 1
CHILLS: 0
GASTROINTESTINAL NEGATIVE: 1
CONSTITUTIONAL NEGATIVE: 1
TINGLING: 0
CARDIOVASCULAR NEGATIVE: 1
BLURRED VISION: 0
PSYCHIATRIC NEGATIVE: 1
EYES NEGATIVE: 1
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
## 2019-10-20 ENCOUNTER — HOSPITAL ENCOUNTER (EMERGENCY)
Facility: MEDICAL CENTER | Age: 39
End: 2019-10-20
Attending: EMERGENCY MEDICINE
Payer: COMMERCIAL

## 2019-10-20 VITALS
SYSTOLIC BLOOD PRESSURE: 138 MMHG | WEIGHT: 315 LBS | OXYGEN SATURATION: 98 % | RESPIRATION RATE: 16 BRPM | HEART RATE: 85 BPM | TEMPERATURE: 98.3 F | DIASTOLIC BLOOD PRESSURE: 76 MMHG | HEIGHT: 75 IN | BODY MASS INDEX: 39.17 KG/M2
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
## 2019-10-20 PROCEDURE — 99284 EMERGENCY DEPT VISIT MOD MDM: CPT

## 2019-10-20 PROCEDURE — 700102 HCHG RX REV CODE 250 W/ 637 OVERRIDE(OP): Performed by: EMERGENCY MEDICINE

## 2019-10-20 PROCEDURE — A9270 NON-COVERED ITEM OR SERVICE: HCPCS | Performed by: EMERGENCY MEDICINE

## 2019-10-20 RX ORDER — AMOXICILLIN AND CLAVULANATE POTASSIUM 875; 125 MG/1; MG/1
1 TABLET, FILM COATED ORAL 2 TIMES DAILY
Qty: 20 TAB | Refills: 0 | Status: SHIPPED | OUTPATIENT
Start: 2019-10-20 | End: 2019-10-30

## 2019-10-20 RX ORDER — AMOXICILLIN AND CLAVULANATE POTASSIUM 875; 125 MG/1; MG/1
1 TABLET, FILM COATED ORAL 2 TIMES DAILY
Qty: 20 TAB | Refills: 0 | Status: SHIPPED | OUTPATIENT
Start: 2019-10-20 | End: 2019-10-20 | Stop reason: SDUPTHER

## 2019-10-20 RX ORDER — AMOXICILLIN AND CLAVULANATE POTASSIUM 875; 125 MG/1; MG/1
1 TABLET, FILM COATED ORAL ONCE
Status: COMPLETED | OUTPATIENT
Start: 2019-10-20 | End: 2019-10-20

## 2019-10-20 RX ADMIN — AMOXICILLIN AND CLAVULANATE POTASSIUM 1 TABLET: 875; 125 TABLET, FILM COATED ORAL at 14:46

## 2019-10-20 NOTE — ED NOTES
Pt cleared for dischg   Instructions given to pt  Verbally understands  Rx augmentin given  Pt aware to fill and take as prescribed  To f/u w/ medical care as needed

## 2019-10-20 NOTE — ED PROVIDER NOTES
"ED Provider Note    CHIEF COMPLAINT  Chief Complaint   Patient presents with   • Dog Bite       HPI  Edwardo Nieves is a 39 y.o. adult who presents with a dog bite.  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  The dog is fully vaccinated.  The patient washed his wounds out with soap and water and rubbing alcohol multiple times.  He is unsure of his last tetanus shot.  He says that his mother was allergic to penicillin but he is not allergic to penicillin.      REVIEW OF SYSTEMS  Positive for dog bite, negative for other injuries.     PAST MEDICAL HISTORY       SOCIAL HISTORY  Social History     Tobacco Use   • Smoking status: Never Smoker   • Smokeless tobacco: Never Used   • Tobacco comment: many years of second hand smoke exposure growing up in a smoking household.    Substance and Sexual Activity   • Alcohol use: No   • Drug use: No   • Sexual activity: Yes     Partners: Female     Comment: works at  at [employer] in [place]       SURGICAL HISTORY  patient denies any surgical history    CURRENT MEDICATIONS  Home Medications    **Home medications have not yet been reviewed for this encounter**         ALLERGIES  Allergies   Allergen Reactions   • Orange Shortness of Breath   • Smoked Meat [Pickled Meat]      Smoke from tobacco will cause rash if contact   • Penicillins      Family Hx of allergy       PHYSICAL EXAM  VITAL SIGNS: /80   Pulse 78   Temp 37.1 °C (98.7 °F) (Temporal)   Resp 16   Ht 1.905 m (6' 3\")   Wt (!) 148 kg (326 lb 4.5 oz)   SpO2 98%   BMI 40.78 kg/m²   Constitutional: Alert in no apparent distress. Well apearing  HENT: Normocephalic, Atraumatic, Bilateral external ears normal. Nose normal.   Eyes:  Conjunctiva normal, non-icteric.   Lungs: Non-labored respirations  Skin: Warm, Dry, No erythema, No rash.   Neurologic: Alert, Grossly non-focal.   Psychiatric: Affect normal, Judgment normal, Mood normal, Appears appropriate and not " intoxicated.   Extremities: On the left wrist there are three 1 to 2 cm lacerations with minimal bleeding.  On the left index finger there are multiple abrasions      DIAGNOSTIC STUDIES / PROCEDURES      COURSE & MEDICAL DECISION MAKING  Pertinent Labs & Imaging studies reviewed. (See chart for details)  This is a 39-year-old who presents with a dog bite to the left wrist.  Given the location of these lacerations they do not require any suturing.  He will be updated with his tetanus and given antibiotic prophylaxis.     The patient will return for new or worsening symptoms and is stable at the time of discharge. Patient was given return precautions. Patient and/or family member verbalizes understanding and will comply.    DISPOSITION:  Patient will be discharged home in stable condition.    FOLLOW UP:  Luis A Kennedy M.D.  [address] [facility] [address] [phone]      As needed, Please follow-up your blood pressure was elevated    [facility], Emergency Dept  [address] [facility] [address] [phone]    Return for worsening redness swelling drainage or other concerns.        OUTPATIENT MEDICATIONS:  New Prescriptions    AMOXICILLIN-CLAVULANATE (AUGMENTIN) 875-125 MG TAB    Take 1 Tab by mouth 2 times a day for 10 days.       Your blood pressure is elevated here in the emergency department. Please monitor your blood pressure over the next several days. If your blood pressure continues to be 120/80 or higher please contact your physician for blood pressure management.       FINAL IMPRESSION  1. Dog bite, initial encounter         2.   3.     This dictation has been creating using voice recognition software. The accuracy of the dictation is limited the abilities of the software.  I expect there may be some errors of grammar and possibly content. I made every attempt to manually correct the errors within my dictation. However errors related to this voice  recognition software may still exist and should be interpreted within the appropriate context.        The note accurately reflects work and decisions made by me.  Joceline Benoit  10/20/2019  2:39 PM

## 2019-10-20 NOTE — ED TRIAGE NOTES
"Presents complaining of a dog bite affecting his left index, and wrist.  He sustained the injury earlier this afternoon, caused by a dog he is \"reabilitating.\"   Chief Complaint   Patient presents with   • Dog Bite     /80   Pulse 78   Temp 37.1 °C (98.7 °F) (Temporal)   Resp 16   Ht 1.905 m (6' 3\")   Wt (!) 148 kg (326 lb 4.5 oz)   SpO2 98%   BMI 40.78 kg/m²       "

## 2019-11-01 ENCOUNTER — OFFICE VISIT (OUTPATIENT)
Dept: MEDICAL GROUP | Facility: PHYSICIAN GROUP | Age: 39
End: 2019-11-01
Payer: COMMERCIAL

## 2019-11-01 VITALS
TEMPERATURE: 98 F | RESPIRATION RATE: 19 BRPM | HEIGHT: 75 IN | BODY MASS INDEX: 39.17 KG/M2 | HEART RATE: 70 BPM | WEIGHT: 315 LBS | SYSTOLIC BLOOD PRESSURE: 112 MMHG | OXYGEN SATURATION: 98 % | DIASTOLIC BLOOD PRESSURE: 80 MMHG
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
## 2019-11-01 PROCEDURE — 99212 OFFICE O/P EST SF 10 MIN: CPT | Performed by: NURSE PRACTITIONER

## 2019-11-01 RX ORDER — MUPIROCIN CALCIUM 20 MG/G
1 CREAM TOPICAL 2 TIMES DAILY
Qty: 1 TUBE | Refills: 2 | Status: SHIPPED | OUTPATIENT
Start: 2019-11-01 | End: 2019-11-19

## 2019-11-01 NOTE — ASSESSMENT & PLAN NOTE
New problem.  Patient presents with follow-up ER visit on 10/20/2019 for dog bite on the left wrist.  Patient completed azithromycin.  Patient states the pain is minimal now and the wound looks much better, no drainage, no redness, swelling is coming down.  Patient keeps it covered and only open to air for irrigation with hydrogen peroxide and rubbing alcohol.  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  Tetanus vaccine in 2014.  Patient denies fevers, dizziness, no numbness or tingling in the left upper extremity, no issues with range of motion in the left hand.  Animal is known to be vaccinated, however tests are in process.

## 2019-11-01 NOTE — PROGRESS NOTES
Chief Complaint   Patient presents with   • Neck Pain     Due to trauma, x7 months   • Animal Bite     F/V       HISTORY OF PRESENT ILLNESS: Patient is a 39 y.o. adult, established patient who presents today to discuss medical problems as listed below:    Dog bite  New problem.  Patient presents with follow-up ER visit on 10/20/2019 for dog bite on the left wrist.  Patient completed azithromycin.  Patient states the pain is minimal now and the wound looks much better, no drainage, no redness, swelling is coming down.  Patient keeps it covered and only open to air for irrigation with hydrogen peroxide and rubbing alcohol.  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  Tetanus vaccine in 2014.  Patient denies fevers, dizziness, no numbness or tingling in the left upper extremity, no issues with range of motion in the left hand.  Animal is known to be vaccinated, however tests are in process.      Patient Active Problem List    Diagnosis Date Noted   • Dog bite 11/01/2019   • Obesity (BMI 35.0-39.9 without comorbidity) ([facility]) 09/19/2018        Allergies: Orange; Smoked meat [pickled meat]; and Penicillins    Current Outpatient Medications   Medication Sig Dispense Refill   • mupirocin calcium (BACTROBAN) 2 % Cream Apply 1 Application to affected area(s) 2 times a day. 1 Tube 2   • vitamin D (CHOLECALCIFEROL) 1000 UNIT Tab Take 1,000 Units by mouth every day.     • asa/apap/caffeine (EXCEDRIN) 250-250-65 MG Tab Take 1 Tab by mouth every 6 hours as needed for Headache.     • Multiple Vitamins-Minerals (MULTI FOR HIM PO) Take  by mouth.     • mag hydrox-al hydrox-simeth-diphenhydrAMINE-lidocaine viscous 2% Take 15 mL by mouth every 6 hours as needed. (Patient not taking: Reported on 5/7/2019) 300 mL 0   • ibuprofen (MOTRIN) 200 MG Tab Take 200 mg by mouth every 6 hours as needed.     • methylPREDNISolone (MEDROL DOSPACK) 4 MG TABS Use as directed. (Patient not taking: Reported  "on 5/7/2019) 21 Tab 0   • hydrocod polst-chlorphen polst (TUSSIONEX) 10-8 MG/5ML LQCR Take 5 mL by mouth every 12 hours. (Patient not taking: Reported on 5/7/2019) 140 mL 1   • azithromycin (ZITHROMAX) 250 MG TABS Take 2 tablets PO on day one, then 1 tablet PO on day two to five. (Patient not taking: Reported on 5/15/2019) 6 Tab 0   • benzonatate (TESSALON) 200 MG capsule Take 1 Cap by mouth 3 times a day as needed for Cough. (Patient not taking: Reported on 5/7/2019) 60 Cap 0     No current facility-administered medications for this visit.        Social History     Tobacco Use   • Smoking status: Never Smoker   • Smokeless tobacco: Never Used   • Tobacco comment: many years of second hand smoke exposure growing up in a smoking household.    Substance Use Topics   • Alcohol use: No   • Drug use: No     Social History     Social History Narrative   • Not on file       Family History   Problem Relation Age of Onset   • Cancer Mother        Allergies, past medical history, past surgical history, family history, social history reviewed and updated.    Review of Systems:      - Constitutional: Negative for fever, chills, unexpected weight change, and fatigue/generalized weakness.     - Respiratory: Negative for cough, sputum production, chest congestion, dyspnea, wheezing, and crackles.      - Cardiovascular: Negative for chest pain, palpitations, orthopnea,   - Skin: Dog bite open laceration and left wrist.    All other systems reviewed and are negative    Exam:    /80   Pulse 70   Temp 36.7 °C (98 °F) (Temporal)   Resp 19   Ht 1.905 m (6' 3\")   Wt (!) 147.7 kg (325 lb 9.6 oz)   SpO2 98%   BMI 40.70 kg/m²  Body mass index is 40.7 kg/m².    Physical Exam:    Skin: Skin lesion approximately 2 mm on the upper left wrist, no bleeding, no erythema, no pus, positive for mild edema.  Cardiovascular: Regular rate and rhythm, S1 and S2 without murmur, rubs, or gallops.    Chest: Effort normal. Clear to auscultation " throughout. No adventitious sounds.   Psychiatric:  Behavior, mood, and affect are appropriate.    Assessment/Plan:  1. Dog bite, sequela  Stable, healing.  Continue irrigating the wound with saline solution, recommend continue to use hydrogen peroxide and also Epson salt.  Apply Bactroban cream after pending area dry.  Okay to keep open to air at home, covering for outside.  If experiencing signs of inflammation such as increased swelling, redness increased pain, come back to the office for reevaluation.   -Bactroban 2% cream    Discussed with patient possible alternative diagnoses, pt is to take all medications as prescribed. If symptoms persist FU w/PCP, if symptoms worsen go to emergency room. If experiencing any side effects from prescribed medications reports to the office immediately or go to emergency room.  Reviewed indication, dosage, usage and potential adverse effects of prescribed medications. Reviewed risks and benefits of treatment plan. Patient verbalizes understanding of all instruction and verbally agrees to plan.    Return if symptoms worsen or fail to improve.

## 2019-11-04 ENCOUNTER — TELEPHONE (OUTPATIENT)
Dept: MEDICAL GROUP | Facility: PHYSICIAN GROUP | Age: 39
End: 2019-11-04

## 2019-11-04 NOTE — TELEPHONE ENCOUNTER
Fiona called and was requesting to change this ointment to cream, due to not being covered. I was advised that this would be the same strength, just in cream form.     If approved, call [facility] pharmacy to confirm approval.

## 2019-11-19 ENCOUNTER — OFFICE VISIT (OUTPATIENT)
Dept: MEDICAL GROUP | Facility: PHYSICIAN GROUP | Age: 39
End: 2019-11-19
Payer: COMMERCIAL

## 2019-11-19 VITALS
SYSTOLIC BLOOD PRESSURE: 112 MMHG | WEIGHT: 315 LBS | TEMPERATURE: 98.2 F | BODY MASS INDEX: 39.17 KG/M2 | OXYGEN SATURATION: 98 % | DIASTOLIC BLOOD PRESSURE: 70 MMHG | HEART RATE: 86 BPM | HEIGHT: 75 IN | RESPIRATION RATE: 16 BRPM

## 2019-11-19 DIAGNOSIS — S16.1XXD STRAIN OF NECK MUSCLE, SUBSEQUENT ENCOUNTER: ICD-10-CM

## 2019-11-19 PROCEDURE — 99214 OFFICE O/P EST MOD 30 MIN: CPT | Performed by: FAMILY MEDICINE

## 2019-11-19 RX ORDER — MULTIVIT WITH MINERALS/LUTEIN
1 TABLET ORAL DAILY
COMMUNITY
End: 2021-03-18

## 2019-11-19 ASSESSMENT — ENCOUNTER SYMPTOMS
HEMOPTYSIS: 0
COUGH: 0
DIZZINESS: 0
MYALGIAS: 0
BRUISES/BLEEDS EASILY: 0
BLURRED VISION: 0
DOUBLE VISION: 0
PALPITATIONS: 0
CHILLS: 0
HEARTBURN: 0
CONSTITUTIONAL NEGATIVE: 1
DEPRESSION: 0
PSYCHIATRIC NEGATIVE: 1
HEADACHES: 1
EYES NEGATIVE: 1
FEVER: 0
TINGLING: 0
CARDIOVASCULAR NEGATIVE: 1
RESPIRATORY NEGATIVE: 1
GASTROINTESTINAL NEGATIVE: 1
NAUSEA: 0
NECK PAIN: 1
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
## 2021-03-18 ENCOUNTER — OFFICE VISIT (OUTPATIENT)
Dept: MEDICAL GROUP | Facility: LAB | Age: 41
End: 2021-03-18
Payer: COMMERCIAL

## 2021-03-18 ENCOUNTER — HOSPITAL ENCOUNTER (OUTPATIENT)
Dept: LAB | Facility: MEDICAL CENTER | Age: 41
End: 2021-03-18
Attending: FAMILY MEDICINE
Payer: COMMERCIAL

## 2021-03-18 ENCOUNTER — HOSPITAL ENCOUNTER (OUTPATIENT)
Dept: RADIOLOGY | Facility: MEDICAL CENTER | Age: 41
End: 2021-03-18
Attending: FAMILY MEDICINE
Payer: COMMERCIAL

## 2021-03-18 VITALS
HEART RATE: 71 BPM | SYSTOLIC BLOOD PRESSURE: 102 MMHG | TEMPERATURE: 96.8 F | WEIGHT: 310 LBS | BODY MASS INDEX: 38.54 KG/M2 | HEIGHT: 75 IN | RESPIRATION RATE: 13 BRPM | DIASTOLIC BLOOD PRESSURE: 68 MMHG | OXYGEN SATURATION: 97 %

## 2021-03-18 DIAGNOSIS — E66.9 OBESITY (BMI 35.0-39.9 WITHOUT COMORBIDITY): ICD-10-CM

## 2021-03-18 DIAGNOSIS — R07.9 CHEST PAIN, UNSPECIFIED TYPE: ICD-10-CM

## 2021-03-18 DIAGNOSIS — Z76.89 ENCOUNTER TO ESTABLISH CARE: ICD-10-CM

## 2021-03-18 DIAGNOSIS — F33.41 RECURRENT MAJOR DEPRESSIVE DISORDER, IN PARTIAL REMISSION (HCC): ICD-10-CM

## 2021-03-18 LAB
ALBUMIN SERPL BCP-MCNC: 4.4 G/DL (ref 3.2–4.9)
ALBUMIN/GLOB SERPL: 1.5 G/DL
ALP SERPL-CCNC: 69 U/L (ref 30–99)
ALT SERPL-CCNC: 23 U/L (ref 2–50)
ANION GAP SERPL CALC-SCNC: 8 MMOL/L (ref 7–16)
AST SERPL-CCNC: 27 U/L (ref 12–45)
BILIRUB SERPL-MCNC: 0.5 MG/DL (ref 0.1–1.5)
BUN SERPL-MCNC: 20 MG/DL (ref 8–22)
CALCIUM SERPL-MCNC: 9.3 MG/DL (ref 8.5–10.5)
CHLORIDE SERPL-SCNC: 105 MMOL/L (ref 96–112)
CHOLEST SERPL-MCNC: 161 MG/DL (ref 100–199)
CO2 SERPL-SCNC: 25 MMOL/L (ref 20–33)
CREAT SERPL-MCNC: 1.06 MG/DL (ref 0.5–1.4)
ERYTHROCYTE [DISTWIDTH] IN BLOOD BY AUTOMATED COUNT: 41.4 FL (ref 35.9–50)
FASTING STATUS PATIENT QL REPORTED: NORMAL
GLOBULIN SER CALC-MCNC: 2.9 G/DL (ref 1.9–3.5)
GLUCOSE SERPL-MCNC: 111 MG/DL (ref 65–99)
HCT VFR BLD AUTO: 45.7 % (ref 42–52)
HDLC SERPL-MCNC: 44 MG/DL
HGB BLD-MCNC: 15.4 G/DL (ref 14–18)
LDLC SERPL CALC-MCNC: 100 MG/DL
MCH RBC QN AUTO: 28.8 PG (ref 27–33)
MCHC RBC AUTO-ENTMCNC: 33.7 G/DL (ref 33.7–35.3)
MCV RBC AUTO: 85.6 FL (ref 81.4–97.8)
PLATELET # BLD AUTO: 256 K/UL (ref 164–446)
PMV BLD AUTO: 9.6 FL (ref 9–12.9)
POTASSIUM SERPL-SCNC: 4.7 MMOL/L (ref 3.6–5.5)
PROT SERPL-MCNC: 7.3 G/DL (ref 6–8.2)
RBC # BLD AUTO: 5.34 M/UL (ref 4.7–6.1)
SODIUM SERPL-SCNC: 138 MMOL/L (ref 135–145)
TRIGL SERPL-MCNC: 84 MG/DL (ref 0–149)
TSH SERPL DL<=0.005 MIU/L-ACNC: 1.95 UIU/ML (ref 0.38–5.33)
WBC # BLD AUTO: 5.4 K/UL (ref 4.8–10.8)

## 2021-03-18 PROCEDURE — 36415 COLL VENOUS BLD VENIPUNCTURE: CPT

## 2021-03-18 PROCEDURE — 80061 LIPID PANEL: CPT

## 2021-03-18 PROCEDURE — 85027 COMPLETE CBC AUTOMATED: CPT

## 2021-03-18 PROCEDURE — 80053 COMPREHEN METABOLIC PANEL: CPT

## 2021-03-18 PROCEDURE — 84443 ASSAY THYROID STIM HORMONE: CPT

## 2021-03-18 PROCEDURE — 71046 X-RAY EXAM CHEST 2 VIEWS: CPT

## 2021-03-18 PROCEDURE — 99214 OFFICE O/P EST MOD 30 MIN: CPT | Performed by: FAMILY MEDICINE

## 2021-03-18 RX ORDER — HYDROCORTISONE ACETATE 0.5 %
CREAM (GRAM) TOPICAL
COMMUNITY

## 2021-03-18 RX ORDER — NIACINAMIDE, ADENOSINE 5; .1 G/250ML; G/250ML
300 LIQUID TOPICAL DAILY
COMMUNITY

## 2021-03-18 RX ORDER — AMPICILLIN TRIHYDRATE 250 MG
CAPSULE ORAL DAILY
COMMUNITY

## 2021-03-18 RX ORDER — GINKGO BILOBA LEAF EXTRACT 60 MG
CAPSULE ORAL DAILY
COMMUNITY

## 2021-03-18 ASSESSMENT — PATIENT HEALTH QUESTIONNAIRE - PHQ9
CLINICAL INTERPRETATION OF PHQ2 SCORE: 2
5. POOR APPETITE OR OVEREATING: 1 - SEVERAL DAYS
SUM OF ALL RESPONSES TO PHQ QUESTIONS 1-9: 11

## 2021-03-18 ASSESSMENT — ENCOUNTER SYMPTOMS
VOMITING: 0
DIARRHEA: 0
CHILLS: 0
PALPITATIONS: 0
ABDOMINAL PAIN: 0
NAUSEA: 0
BLURRED VISION: 0
NERVOUS/ANXIOUS: 0
DEPRESSION: 0
DIZZINESS: 0
HEADACHES: 0
WHEEZING: 0
SHORTNESS OF BREATH: 0
FEVER: 0

## 2021-03-18 NOTE — PROGRESS NOTES
Subjective:   Edwardo Nieves is a 40 y.o. adult here today for   Chief Complaint   Patient presents with   • Establish Care   • Chest Pain     Bone spur, X nov 2020, slow becoming more frequently, daily,  upper left. HX heart arrythmia  no ekg done before, Tremble can fel pluse on all extremities        #Establish care   -Reviewed all past medical history, family history, social history.  -Reviewed all screening/vaccinations: Up-to-date.  Does need annual blood work completed today.  -Diet and Exercise: Is working on good diet, exercise regimen.  Has recently lost 30 to 50 pounds.  Will continue work on good diet and exercise regimen.  -Tobacco, alcohol, recreational drug use: Very occasional alcohol use, less than once a week, denies any tobacco or recreational illicit drug use.  -Sexually active: Yes, monogamous with female partner, no concerns regarding relationship.  -Occupation: Works in IT at [employer].    #Chest pain  -Ongoing for 1-2 years, becoming more consistent since last Nov.   -Patient states that he is having a daily, sharp, intermittent pain in his chest located in upper left side.  He states that it lasts only for a few minutes.  He is able to point exactly where the pain is on the chest.  It is on the sternal costal border.  -He has felt it both at rest as well as with exercise.  He is normally treated with aspirin which alleviates the pain immediately.  -Has no other symptoms including shortness of breath, palpitations, syncope, presyncopal episodes.  No history of any heart condition.    #Depression:  -Patient states he has longstanding history of depression which has been ongoing since childhood.  He is currently not taking any medications, not seeing any counselor is not interested in either at this time.  He does state that the occasional passive death wish occurring approximately once a month but he normally talks to his wife through these feelings.  He is feeling well at this time and has no  "concerns.    Allergies   Allergen Reactions   • Orange Shortness of Breath   • Penicillins      Family Hx of allergy     • Tobacco [Nicotiana Tabacum]          Current medicines (including changes today)  Current Outpatient Medications   Medication Sig Dispense Refill   • vitamin E (VITAMIN E) 1000 UNIT Cap Take 1 Cap by mouth every day.     • ibuprofen (MOTRIN) 200 MG Tab Take 200 mg by mouth every 6 hours as needed.     • vitamin D (CHOLECALCIFEROL) 1000 UNIT Tab Take 1,000 Units by mouth every day.     • asa/apap/caffeine (EXCEDRIN) 250-250-65 MG Tab Take 1 Tab by mouth every 6 hours as needed for Headache.     • Multiple Vitamins-Minerals (MULTI FOR HIM PO) Take  by mouth.       No current facility-administered medications for this visit.     She  has no past medical history of Diabetes (HCC), Hyperlipidemia, or Hypertension.    ROS   Review of Systems   Constitutional: Negative for chills and fever.   HENT: Negative for hearing loss.    Eyes: Negative for blurred vision.   Respiratory: Negative for shortness of breath and wheezing.    Cardiovascular: Positive for chest pain. Negative for palpitations.   Gastrointestinal: Negative for abdominal pain, diarrhea, nausea and vomiting.   Neurological: Negative for dizziness and headaches.   Psychiatric/Behavioral: Negative for depression. The patient is not nervous/anxious.         Objective:     Physical Exam:  /68   Pulse 71   Temp 36 °C (96.8 °F) (Temporal)   Resp 13   Ht 1.905 m (6' 3\")   Wt (!) 141 kg (310 lb)   SpO2 97%  Body mass index is 38.75 kg/m².   Constitutional: Alert, no distress.  Skin: Warm, dry, good turgor, no rashes in visible areas.  Eye: Equal, round and reactive, conjunctiva clear, lids normal.  Neck: Trachea midline, no masses, no thyromegaly. No cervical or supraclavicular lymphadenopathy.  Respiratory: Unlabored respiratory effort, lungs clear to auscultation, no wheezes, no rhonchi.  Cardiovascular: Normal S1, S2, no murmur, no " edema.  Abdomen: Soft, non-tender, no masses, no hepatosplenomegaly.  Psych: Alert and oriented x3, normal affect and mood.    EKG: Normal EKG, normal sinus rhythm, normal axis.  No ST elevations, depressions noted.  There is a slight early repolarization pattern but otherwise normal EKG at this time.    Assessment and Plan:     1. Encounter to establish care  -All questions concerns were answered at this time.  -Vaccinations/screenings needed at this time: up to date. Will check labs at this time   -Labs reviewed, no concerns. Will check labs as below  -Discussed the importance of a healthy, Mediterranean style diet, routine exercise regimen.  -Discussed general safety measures including seatbelts, helmets, avoidance of smoking, sunscreen, hydration.  -Follow-up for general physical exam on a yearly basis, sooner if needed.  - CBC WITHOUT DIFFERENTIAL; Future  - Comp Metabolic Panel; Future    2. Chest pain, unspecified type  -Mostly normal EKG.  Given slight early repolarization as well as symptoms that occur with exercise I do recommend an exercise stress test at this time.  We will also get chest x-ray to rule out any osseous or pulmonary pathology.  We also check labs.  At this time I do suspect this is mostly costochondritis although anxiety cannot be ruled out.  Treat with NSAIDs as needed.  Will follow up with me as needed.  Return precautions were given.  -Follow-up in 6 months  - DX-CHEST-2 VIEWS; Future  - EKG - Clinic Performed  - Cardiac Stress Test Treadmill Only    3. Obesity (BMI 35.0-39.9 without comorbidity) (HCC)  -Patient has lost a significant amount of weight and continues to work on good diet and exercise.  I congratulated patient on his hard work and encouraged him to continue with appropriate diet and exercise regimen.  We will check labs as below.  Follow-up as needed.  - Lipid Profile; Future  - TSH WITH REFLEX TO FT4; Future    4. Recurrent major depressive disorder, in partial remission  (HCC)  -Status: Stable.  No concerns at this time.  No suicidal/homicidal ideations currently but I did express to patient that if symptoms worsen he is to follow-up with me immediately.  Patient understood and agreed with plan.      Followup: Return in about 6 months (around 9/18/2021).         PLEASE NOTE: This dictation was created using voice recognition software. I have made every reasonable attempt to correct obvious errors, but I expect that there are errors of grammar and possibly content that I did not discover before finalizing the note.

## 2021-09-08 ENCOUNTER — NON-PROVIDER VISIT (OUTPATIENT)
Dept: MEDICAL GROUP | Facility: LAB | Age: 41
End: 2021-09-08
Payer: COMMERCIAL

## 2021-09-08 ENCOUNTER — TELEPHONE (OUTPATIENT)
Dept: MEDICAL GROUP | Facility: LAB | Age: 41
End: 2021-09-08

## 2021-09-08 ENCOUNTER — HOSPITAL ENCOUNTER (OUTPATIENT)
Facility: MEDICAL CENTER | Age: 41
End: 2021-09-08
Attending: FAMILY MEDICINE
Payer: COMMERCIAL

## 2021-09-08 DIAGNOSIS — Z20.828 EXPOSURE TO SARS VIRUS: ICD-10-CM

## 2021-09-08 PROCEDURE — U0005 INFEC AGEN DETEC AMPLI PROBE: HCPCS

## 2021-09-08 PROCEDURE — U0003 INFECTIOUS AGENT DETECTION BY NUCLEIC ACID (DNA OR RNA); SEVERE ACUTE RESPIRATORY SYNDROME CORONAVIRUS 2 (SARS-COV-2) (CORONAVIRUS DISEASE [COVID-19]), AMPLIFIED PROBE TECHNIQUE, MAKING USE OF HIGH THROUGHPUT TECHNOLOGIES AS DESCRIBED BY CMS-2020-01-R: HCPCS

## 2021-09-08 NOTE — NON-PROVIDER
This is a non-provider visit for a COVID test only.     Patient is asymptomatic but was exposed and needs clearance to go back to work.     3 patient identifiers verified.     Specimen collected. Patient tolerated well.     COVID post-test handout given. Educated to assume positive until results come back.     ADWOA Horner

## 2021-09-09 LAB
COVID ORDER STATUS COVID19: NORMAL
SARS-COV-2 RNA RESP QL NAA+PROBE: NOTDETECTED
SPECIMEN SOURCE: NORMAL

## 2021-09-20 ENCOUNTER — OFFICE VISIT (OUTPATIENT)
Dept: MEDICAL GROUP | Facility: LAB | Age: 41
End: 2021-09-20
Payer: COMMERCIAL

## 2021-09-20 VITALS
RESPIRATION RATE: 15 BRPM | DIASTOLIC BLOOD PRESSURE: 80 MMHG | BODY MASS INDEX: 38.3 KG/M2 | HEIGHT: 75 IN | SYSTOLIC BLOOD PRESSURE: 108 MMHG | OXYGEN SATURATION: 99 % | TEMPERATURE: 96.8 F | WEIGHT: 308 LBS | HEART RATE: 60 BPM

## 2021-09-20 DIAGNOSIS — E66.9 OBESITY (BMI 35.0-39.9 WITHOUT COMORBIDITY): ICD-10-CM

## 2021-09-20 DIAGNOSIS — F33.41 RECURRENT MAJOR DEPRESSIVE DISORDER, IN PARTIAL REMISSION (HCC): ICD-10-CM
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
## 2021-09-20 PROCEDURE — 99213 OFFICE O/P EST LOW 20 MIN: CPT | Performed by: FAMILY MEDICINE

## 2021-09-20 ASSESSMENT — ENCOUNTER SYMPTOMS
CHILLS: 0
PALPITATIONS: 0
INSOMNIA: 0
NERVOUS/ANXIOUS: 0
DEPRESSION: 0
NAUSEA: 0
FEVER: 0

## 2021-09-20 ASSESSMENT — FIBROSIS 4 INDEX: FIB4 SCORE: 0.88

## 2021-09-20 NOTE — PROGRESS NOTES
Subjective:   Edwardo Nieves is a 41 y.o. male here today for   Chief Complaint   Patient presents with   • Follow-Up     depression, weight gain       #Weight gain   -Patient continues to have some difficulty as far as his weight goes.  Since last seen in March he began a heavy exercise regimen and was noted to 88.  Work stress had increased and he had regained most that weight back in the last few months.  He states that now that work is [place] he plans on returning to the gym.  He is working on improving diet although has been difficult given that his wife is allergic to all types of lettuce.  He is asymptomatic at this time like discuss other possible exercise/dieting techniques.      #Depression   -Patient does have a history of depression although never formally treated with pharmacology or therapy.  He states that he is doing well.  Denies any depression, anxiety, increase in insomnia.        Allergies   Allergen Reactions   • Orange Shortness of Breath   • Penicillins      Family Hx of allergy     • Tobacco [Nicotiana Tabacum]          Current medicines (including changes today)  Current Outpatient Medications   Medication Sig Dispense Refill   • Ginseng 100 MG Cap Take  by mouth every day.     • Red Yeast Rice 600 MG Cap Take  by mouth every day.     • Coenzyme Q10 (COQ-10) 150 MG Cap Take 300 mg by mouth every day.     • Multiple Vitamin (MULTI-VITAMIN PO) Take  by mouth.     • L-Lysine 1000 MG Tab Take  by mouth.     • Glucosamine-Chondroit-Vit C-Mn (GLUCOSAMINE 1500 COMPLEX) Cap Take  by mouth.     • vitamin E 180 mg (400 units) Cap Take 180 mg by mouth every day.     • NON SPECIFIED Grape seed and Resveratrol: 200mg polyphenols, 34mg proanthocyanidins, 75mg resveratrol     • NON SPECIFIED Chrondroitin: 1200mg     • ASPIRIN PO Take 325 mg by mouth as needed.       No current facility-administered medications for this visit.     He  has no past medical history of Diabetes (HCC), Hyperlipidemia, or  "Hypertension.    ROS   Review of Systems   Constitutional: Negative for chills and fever.   Cardiovascular: Negative for chest pain and palpitations.   Gastrointestinal: Negative for nausea.   Psychiatric/Behavioral: Negative for depression. The patient is not nervous/anxious and does not have insomnia.         Objective:     Physical Exam:  /80   Pulse 60   Temp 36 °C (96.8 °F) (Temporal)   Resp 15   Ht 1.905 m (6' 3\")   Wt (!) 140 kg (308 lb)   SpO2 99%  Body mass index is 38.5 kg/m².   Constitutional: Alert, no distress.  Skin: Warm, dry, good turgor, no rashes in visible areas.  Eye: Equal, round and reactive, conjunctiva clear, lids normal.  Respiratory: Unlabored respiratory effort, lungs clear to auscultation, no wheezes, no rhonchi.  Cardiovascular: Normal S1, S2, no murmur, no edema.  Abdomen: Soft, non-tender, no masses, no hepatosplenomegaly.  Psych: Alert and oriented x3, normal affect and mood.    Assessment and Plan:     1. Obesity (BMI 35.0-39.9 without comorbidity) (HCC)  -Discussed with patient healthy diet options does not include let us such as vegetables, lean/white meats including chicken fish, beans, legumes.  Also discussed the importance of adding 2030 minutes of aerobic activity to workout regimen daily.  Patient will begin working on this at this time.    2. Recurrent major depressive disorder, in partial remission (HCC)  -Stable.  Although work has been increasingly stressful he states that his been doing well.  No suicidal/homicidal ideations.  Patient will follow-up as needed.    Followup: No follow-ups on file.         PLEASE NOTE: This dictation was created using voice recognition software. I have made every reasonable attempt to correct obvious errors, but I expect that there are errors of grammar and possibly content that I did not discover before finalizing the note.  "

## 2022-08-20 SDOH — ECONOMIC STABILITY: HOUSING INSECURITY
IN THE LAST 12 MONTHS, WAS THERE A TIME WHEN YOU DID NOT HAVE A STEADY PLACE TO SLEEP OR SLEPT IN A SHELTER (INCLUDING NOW)?: NO

## 2022-08-20 SDOH — ECONOMIC STABILITY: TRANSPORTATION INSECURITY
IN THE PAST 12 MONTHS, HAS THE LACK OF TRANSPORTATION KEPT YOU FROM MEDICAL APPOINTMENTS OR FROM GETTING MEDICATIONS?: NO

## 2022-08-20 SDOH — ECONOMIC STABILITY: FOOD INSECURITY: WITHIN THE PAST 12 MONTHS, THE FOOD YOU BOUGHT JUST DIDN'T LAST AND YOU DIDN'T HAVE MONEY TO GET MORE.: NEVER TRUE

## 2022-08-20 SDOH — ECONOMIC STABILITY: FOOD INSECURITY: WITHIN THE PAST 12 MONTHS, YOU WORRIED THAT YOUR FOOD WOULD RUN OUT BEFORE YOU GOT MONEY TO BUY MORE.: NEVER TRUE

## 2022-08-20 SDOH — ECONOMIC STABILITY: HOUSING INSECURITY: IN THE LAST 12 MONTHS, HOW MANY PLACES HAVE YOU LIVED?: 1

## 2022-08-20 SDOH — HEALTH STABILITY: PHYSICAL HEALTH: ON AVERAGE, HOW MANY DAYS PER WEEK DO YOU ENGAGE IN MODERATE TO STRENUOUS EXERCISE (LIKE A BRISK WALK)?: 2 DAYS

## 2022-08-20 SDOH — ECONOMIC STABILITY: TRANSPORTATION INSECURITY
IN THE PAST 12 MONTHS, HAS LACK OF RELIABLE TRANSPORTATION KEPT YOU FROM MEDICAL APPOINTMENTS, MEETINGS, WORK OR FROM GETTING THINGS NEEDED FOR DAILY LIVING?: NO

## 2022-08-20 SDOH — HEALTH STABILITY: PHYSICAL HEALTH: ON AVERAGE, HOW MANY MINUTES DO YOU ENGAGE IN EXERCISE AT THIS LEVEL?: 10 MIN

## 2022-08-20 SDOH — ECONOMIC STABILITY: INCOME INSECURITY: IN THE LAST 12 MONTHS, WAS THERE A TIME WHEN YOU WERE NOT ABLE TO PAY THE MORTGAGE OR RENT ON TIME?: NO

## 2022-08-20 SDOH — ECONOMIC STABILITY: INCOME INSECURITY: HOW HARD IS IT FOR YOU TO PAY FOR THE VERY BASICS LIKE FOOD, HOUSING, MEDICAL CARE, AND HEATING?: NOT VERY HARD

## 2022-08-20 SDOH — HEALTH STABILITY: MENTAL HEALTH
STRESS IS WHEN SOMEONE FEELS TENSE, NERVOUS, ANXIOUS, OR CAN'T SLEEP AT NIGHT BECAUSE THEIR MIND IS TROUBLED. HOW STRESSED ARE YOU?: TO SOME EXTENT

## 2022-08-20 SDOH — ECONOMIC STABILITY: TRANSPORTATION INSECURITY
IN THE PAST 12 MONTHS, HAS LACK OF TRANSPORTATION KEPT YOU FROM MEETINGS, WORK, OR FROM GETTING THINGS NEEDED FOR DAILY LIVING?: NO

## 2022-08-20 ASSESSMENT — SOCIAL DETERMINANTS OF HEALTH (SDOH)
HOW MANY DRINKS CONTAINING ALCOHOL DO YOU HAVE ON A TYPICAL DAY WHEN YOU ARE DRINKING: 1 OR 2
HOW HARD IS IT FOR YOU TO PAY FOR THE VERY BASICS LIKE FOOD, HOUSING, MEDICAL CARE, AND HEATING?: NOT VERY HARD
HOW OFTEN DO YOU ATTEND CHURCH OR RELIGIOUS SERVICES?: NEVER
IN A TYPICAL WEEK, HOW MANY TIMES DO YOU TALK ON THE PHONE WITH FAMILY, FRIENDS, OR NEIGHBORS?: ONCE A WEEK
HOW OFTEN DO YOU HAVE A DRINK CONTAINING ALCOHOL: 2-4 TIMES A MONTH
HOW OFTEN DO YOU GET TOGETHER WITH FRIENDS OR RELATIVES?: ONCE A WEEK
WITHIN THE PAST 12 MONTHS, YOU WORRIED THAT YOUR FOOD WOULD RUN OUT BEFORE YOU GOT THE MONEY TO BUY MORE: NEVER TRUE
DO YOU BELONG TO ANY CLUBS OR ORGANIZATIONS SUCH AS CHURCH GROUPS UNIONS, FRATERNAL OR ATHLETIC GROUPS, OR SCHOOL GROUPS?: NO
HOW OFTEN DO YOU ATTENT MEETINGS OF THE CLUB OR ORGANIZATION YOU BELONG TO?: NEVER
HOW OFTEN DO YOU HAVE SIX OR MORE DRINKS ON ONE OCCASION: LESS THAN MONTHLY
HOW OFTEN DO YOU ATTENT MEETINGS OF THE CLUB OR ORGANIZATION YOU BELONG TO?: NEVER
HOW OFTEN DO YOU GET TOGETHER WITH FRIENDS OR RELATIVES?: ONCE A WEEK
HOW OFTEN DO YOU ATTEND CHURCH OR RELIGIOUS SERVICES?: NEVER
DO YOU BELONG TO ANY CLUBS OR ORGANIZATIONS SUCH AS CHURCH GROUPS UNIONS, FRATERNAL OR ATHLETIC GROUPS, OR SCHOOL GROUPS?: NO
IN A TYPICAL WEEK, HOW MANY TIMES DO YOU TALK ON THE PHONE WITH FAMILY, FRIENDS, OR NEIGHBORS?: ONCE A WEEK

## 2022-08-20 ASSESSMENT — LIFESTYLE VARIABLES
SKIP TO QUESTIONS 9-10: 0
HOW OFTEN DO YOU HAVE SIX OR MORE DRINKS ON ONE OCCASION: LESS THAN MONTHLY
AUDIT-C TOTAL SCORE: 3
HOW MANY STANDARD DRINKS CONTAINING ALCOHOL DO YOU HAVE ON A TYPICAL DAY: 1 OR 2
HOW OFTEN DO YOU HAVE A DRINK CONTAINING ALCOHOL: 2-4 TIMES A MONTH

## 2022-08-22 ENCOUNTER — OFFICE VISIT (OUTPATIENT)
Dept: MEDICAL GROUP | Facility: LAB | Age: 42
End: 2022-08-22
Payer: COMMERCIAL

## 2022-08-22 VITALS
BODY MASS INDEX: 39.17 KG/M2 | SYSTOLIC BLOOD PRESSURE: 120 MMHG | DIASTOLIC BLOOD PRESSURE: 68 MMHG | RESPIRATION RATE: 16 BRPM | OXYGEN SATURATION: 95 % | HEIGHT: 75 IN | WEIGHT: 315 LBS | TEMPERATURE: 98.1 F | HEART RATE: 78 BPM

## 2022-08-22 DIAGNOSIS — Z80.0 FAMILY HX OF COLON CANCER REQUIRING SCREENING COLONOSCOPY: ICD-10-CM

## 2022-08-22 DIAGNOSIS — B00.9 HERPES SIMPLEX: ICD-10-CM

## 2022-08-22 DIAGNOSIS — Z00.00 ANNUAL PHYSICAL EXAM: ICD-10-CM

## 2022-08-22 DIAGNOSIS — Z13.6 SCREENING FOR CARDIOVASCULAR CONDITION: ICD-10-CM

## 2022-08-22 DIAGNOSIS — Z12.83 SKIN CANCER SCREENING: ICD-10-CM

## 2022-08-22 DIAGNOSIS — F33.41 RECURRENT MAJOR DEPRESSIVE DISORDER, IN PARTIAL REMISSION (HCC): ICD-10-CM

## 2022-08-22 PROCEDURE — 99396 PREV VISIT EST AGE 40-64: CPT | Performed by: FAMILY MEDICINE

## 2022-08-22 RX ORDER — VALACYCLOVIR HYDROCHLORIDE 1 G/1
1000 TABLET, FILM COATED ORAL 2 TIMES DAILY
Qty: 60 TABLET | Refills: 3 | Status: SHIPPED | OUTPATIENT
Start: 2022-08-22 | End: 2022-09-21

## 2022-08-22 ASSESSMENT — ENCOUNTER SYMPTOMS
FEVER: 0
NAUSEA: 0
CHILLS: 0
VOMITING: 0
PALPITATIONS: 0
BLURRED VISION: 0
WHEEZING: 0
ABDOMINAL PAIN: 0
SHORTNESS OF BREATH: 0
INSOMNIA: 1
DIARRHEA: 0
DEPRESSION: 1

## 2022-08-22 ASSESSMENT — PATIENT HEALTH QUESTIONNAIRE - PHQ9
SUM OF ALL RESPONSES TO PHQ QUESTIONS 1-9: 13
5. POOR APPETITE OR OVEREATING: 2 - MORE THAN HALF THE DAYS
CLINICAL INTERPRETATION OF PHQ2 SCORE: 3

## 2022-08-22 ASSESSMENT — FIBROSIS 4 INDEX: FIB4 SCORE: 0.88

## 2022-08-22 ASSESSMENT — LIFESTYLE VARIABLES: SUBSTANCE_ABUSE: 0

## 2022-08-22 NOTE — PROGRESS NOTES
"Subjective:   Edwardo Nieves is a 41 y.o. male here today for   Chief Complaint   Patient presents with    Annual Exam     Rx for valacyclovir. Also skin tag on left side of nose.       #Annual   -Reviewed all past medical history, family history, social history.  -Reviewed all screening/vaccinations: Up-to-date on screenings and vaccinations.   -Diet and Exercise: Working on improving diet and exercise for weight loss.  Over the last year he has gained some weight but has lost some weight recently.  -Tobacco, alcohol, recreational drug use: No changes, occasional alcohol use.  Denies any tobacco, recreational licit drug use.  -Sexually active: No changes, monogamous with female partner, no concerns.    #Skin tag  -Ongoing problem for the last 5-7 months. Lesion located on left side of nose.  Patient states that it is nonpainful, not pruritic; however, he has on occasion \"ripped it off\" when itching or rubbing nose.  She is concerned given his significant family history of skin cancer is here for further evaluation    #Herpes   -History of herpes simplex on lower lip, chin.  Occasional breakout a couple times a year which he treats with valacyclovir.  Currently is asymptomatic, feeling well but requesting refill of valacyclovir.    #Depression   -Chronic longstanding condition currently treating with therapy.  He has had a couple of low points over the last year.  Patient has a really great support system with family involved and is able to treat without medication.  Patient has a significant concern regarding medication and is very hesitant to start any.  He states that during his lows he has had suicidal/homicidal ideations.  He has had formed thoughts of suicide; however, this is not occurred in the last 2 years.  He is feel like he is improving but continues to have some symptoms of anhedonia, insomnia at this time.    #Family history of colon cancer:  -Upon further discussion of patient's family history it was " "disclosed that patient had a brother who was diagnosed with colon cancer at age 40.  Patient is asymptomatic at this time but here to discuss other possible screenings.      Allergies   Allergen Reactions    Orange Shortness of Breath    Penicillins      Family Hx of allergy      Tobacco [Nicotiana Tabacum]          Current medicines (including changes today)  Current Outpatient Medications   Medication Sig Dispense Refill    Ginseng 100 MG Cap Take  by mouth every day.      Red Yeast Rice 600 MG Cap Take  by mouth every day.      Coenzyme Q10 (COQ-10) 150 MG Cap Take 300 mg by mouth every day.      Multiple Vitamin (MULTI-VITAMIN PO) Take  by mouth.      L-Lysine 1000 MG Tab Take  by mouth.      Glucosamine-Chondroit-Vit C-Mn (GLUCOSAMINE 1500 COMPLEX) Cap Take  by mouth.      vitamin E 180 mg (400 units) Cap Take 180 mg by mouth every day.      NON SPECIFIED Grape seed and Resveratrol: 200mg polyphenols, 34mg proanthocyanidins, 75mg resveratrol      NON SPECIFIED Chrondroitin: 1200mg      ASPIRIN PO Take 325 mg by mouth as needed.       No current facility-administered medications for this visit.     He  has a past medical history of Dog bite (11/1/2019).    He has no past medical history of Diabetes (HCC), Hyperlipidemia, or Hypertension.    ROS   Review of Systems   Constitutional:  Negative for chills and fever.   HENT:  Negative for hearing loss.    Eyes:  Negative for blurred vision.   Respiratory:  Negative for shortness of breath and wheezing.    Cardiovascular:  Negative for chest pain and palpitations.   Gastrointestinal:  Negative for abdominal pain, diarrhea, nausea and vomiting.   Psychiatric/Behavioral:  Positive for depression. Negative for substance abuse. The patient has insomnia.       Objective:     Physical Exam:  /68   Pulse 78   Temp 36.7 °C (98.1 °F) (Temporal)   Resp 16   Ht 1.905 m (6' 3\")   Wt (!) 147 kg (324 lb)   SpO2 95%  Body mass index is 40.5 kg/m².   Constitutional: " Alert, no distress.  Skin: Warm, dry, good turgor, no rashes in visible areas.  Eye: Equal, round and reactive, conjunctiva clear, lids normal.  Respiratory: Unlabored respiratory effort, lungs clear to auscultation, no wheezes, no rhonchi.  Cardiovascular: Normal S1, S2, no murmur, no edema.  Abdomen: Soft, non-tender, no masses, no hepatosplenomegaly.  Psych: Alert and oriented x3, normal affect and mood.    Depression Screening    Little interest or pleasure in doing things?  1 - several days   Feeling down, depressed , or hopeless? 2 - more than half the days   Trouble falling or staying asleep, or sleeping too much?  3 - nearly every day   Feeling tired or having little energy?  1 - several days   Poor appetite or overeating?  2 - more than half the days   Feeling bad about yourself - or that you are a failure or have let yourself or your family down? 2 - more than half the days   Trouble concentrating on things, such as reading the newspaper or watching television? 1 - several days   Moving or speaking so slowly that other people could have noticed.  Or the opposite - being so fidgety or restless that you have been moving around a lot more than usual?  0 - not at all   Thoughts that you would be better off dead, or of hurting yourself?  1 - several days   Patient Health Questionnaire Score: 13     Assessment and Plan:     1. Annual physical exam  -All questions concerns were answered at this time.  -Vaccinations/screenings needed at this time: We will complete labs as below.  Other screenings are needed given family history of cancer including colonoscopy and referral to dermatology.  -Labs reviewed, no concerns, recheck labs as below.  -Discussed the importance of a healthy, Mediterranean style diet, routine exercise regimen.  -Discussed general safety measures including seatbelts, helmets, avoidance of smoking, sunscreen, hydration.  -Follow-up for general physical exam on a yearly basis, sooner if  needed.  - CBC WITHOUT DIFFERENTIAL; Future  - Comp Metabolic Panel; Future    2. Herpes simplex  -Stable, no concerns, will refill Valtrex at this time.  - valacyclovir (VALTREX) 1 GM Tab; Take 1 Tablet by mouth 2 times a day for 30 days.  Dispense: 60 Tablet; Refill: 3    3. Recurrent major depressive disorder, in partial remission (HCC)  -Patient has had some worsening of symptoms with an elevated PHQ-9.  Again discussed the possibly starting medication which patient declines.  We will continue to watchfully observe her symptoms and rely on good support group with family.  Suicide regimen is in place and patient will follow-up as needed.    4. Family hx of colon cancer requiring screening colonoscopy  -Given family history of colon cancer in first-degree relative at the age of 40 patient will need to start colonoscopies promptly.  Will refer him to GI to discuss starting these colonoscopies in a timely manner.  - Referral to GI for Colonoscopy    5. Skin cancer screening  -Given family history will refer to dermatology for further evaluation and skin cancer screening.  - Referral to Dermatology    6. Screening for cardiovascular condition  - Lipid Profile; Future      Followup: No follow-ups on file.         PLEASE NOTE: This dictation was created using voice recognition software. I have made every reasonable attempt to correct obvious errors, but I expect that there are errors of grammar and possibly content that I did not discover before finalizing the note.

## 2022-09-27 ENCOUNTER — OFFICE VISIT (OUTPATIENT)
Dept: URGENT CARE | Facility: CLINIC | Age: 42
End: 2022-09-27
Payer: COMMERCIAL

## 2022-09-27 VITALS
DIASTOLIC BLOOD PRESSURE: 74 MMHG | WEIGHT: 310 LBS | HEIGHT: 74 IN | SYSTOLIC BLOOD PRESSURE: 124 MMHG | TEMPERATURE: 97.5 F | RESPIRATION RATE: 18 BRPM | HEART RATE: 82 BPM | OXYGEN SATURATION: 97 % | BODY MASS INDEX: 39.78 KG/M2

## 2022-09-27 DIAGNOSIS — H10.31 ACUTE BACTERIAL CONJUNCTIVITIS OF RIGHT EYE: ICD-10-CM

## 2022-09-27 PROCEDURE — 99213 OFFICE O/P EST LOW 20 MIN: CPT | Performed by: FAMILY MEDICINE

## 2022-09-27 RX ORDER — AMOXICILLIN 500 MG/1
TABLET, FILM COATED ORAL
COMMUNITY
Start: 2022-07-19 | End: 2022-09-27

## 2022-09-27 RX ORDER — OXYCODONE HYDROCHLORIDE AND ACETAMINOPHEN 5; 325 MG/1; MG/1
TABLET ORAL
COMMUNITY
Start: 2022-07-19 | End: 2022-09-27

## 2022-09-27 RX ORDER — IBUPROFEN 600 MG/1
600 TABLET ORAL EVERY 6 HOURS PRN
COMMUNITY
Start: 2022-07-19 | End: 2022-09-27

## 2022-09-27 RX ORDER — POLYMYXIN B SULFATE AND TRIMETHOPRIM 1; 10000 MG/ML; [USP'U]/ML
1 SOLUTION OPHTHALMIC EVERY 4 HOURS
Qty: 10 ML | Refills: 0 | Status: SHIPPED | OUTPATIENT
Start: 2022-09-27

## 2022-09-27 RX ORDER — PENICILLIN V POTASSIUM 500 MG/1
500 TABLET ORAL EVERY 6 HOURS
COMMUNITY
Start: 2022-07-19 | End: 2022-09-27

## 2022-09-27 ASSESSMENT — FIBROSIS 4 INDEX: FIB4 SCORE: 0.9

## 2022-09-28 NOTE — PROGRESS NOTES
"  Subjective:      42 y.o. male presents to urgent care for right eye abnormalities.  Saturday he developed some redness to his right eye, some discharge, and irritation.  He was doing yard work around horse manure, and was chopping wood.  Does not remember getting anything in his eye.  No change in vision.  No actual eye pain.  He does wear corrective eyeglasses, but not contact lenses.  He remains afebrile.    He denies any other questions or concerns at this time.    Current problem list, medication, and past medical/surgical history were reviewed in Epic.    ROS  See HPI     Objective:      /74   Pulse 82   Temp 36.4 °C (97.5 °F) (Temporal)   Resp 18   Ht 1.88 m (6' 2\")   Wt (!) 141 kg (310 lb)   SpO2 97%   BMI 39.80 kg/m²     Physical Exam  Constitutional:       General: He is not in acute distress.     Appearance: He is not diaphoretic.   Eyes:      General:         Right eye: Discharge present.         Left eye: No discharge.      Extraocular Movements: Extraocular movements intact.      Conjunctiva/sclera:      Right eye: Right conjunctiva is injected.      Left eye: Left conjunctiva is not injected.      Pupils: Pupils are equal, round, and reactive to light.      Comments: Right lower eye lid edematous   Cardiovascular:      Rate and Rhythm: Normal rate and regular rhythm.      Heart sounds: Normal heart sounds.   Pulmonary:      Effort: Pulmonary effort is normal. No respiratory distress.      Breath sounds: Normal breath sounds.   Neurological:      Mental Status: He is alert.   Psychiatric:         Mood and Affect: Affect normal.         Judgment: Judgment normal.     Assessment/Plan:     1. Acute bacterial conjunctivitis of right eye  Eye was numbed with proparacaine and stained with fluorescein.  Eye was examined under Woods lamp, no corneal abrasions noted.  No foreign bodies noted.  Most consistent with conjunctivitis.  Prescription for Polytrim has been sent, he is low risk as he does " not wear contact lenses.  He was encouraged to place warm compresses to the eye 2 times daily.  - polymixin-trimethoprim (POLYTRIM) 21186-3.1 UNIT/ML-% Solution; Administer 1 Drop into both eyes every 4 hours.  Dispense: 10 mL; Refill: 0      Instructed to return to Urgent Care or nearest Emergency Department if symptoms fail to improve, for any change in condition, further concerns, or new concerning symptoms. Patient states understanding of the plan of care and discharge instructions.    Viv Bello M.D.

## 2022-11-10 ENCOUNTER — APPOINTMENT (RX ONLY)
Dept: URBAN - METROPOLITAN AREA CLINIC 31 | Facility: CLINIC | Age: 42
Setting detail: DERMATOLOGY
End: 2022-11-10

## 2022-11-10 DIAGNOSIS — D22 MELANOCYTIC NEVI: ICD-10-CM

## 2022-11-10 DIAGNOSIS — L82.1 OTHER SEBORRHEIC KERATOSIS: ICD-10-CM

## 2022-11-10 DIAGNOSIS — L81.4 OTHER MELANIN HYPERPIGMENTATION: ICD-10-CM

## 2022-11-10 DIAGNOSIS — D18.0 HEMANGIOMA: ICD-10-CM

## 2022-11-10 DIAGNOSIS — Z71.89 OTHER SPECIFIED COUNSELING: ICD-10-CM

## 2022-11-10 PROBLEM — D18.01 HEMANGIOMA OF SKIN AND SUBCUTANEOUS TISSUE: Status: ACTIVE | Noted: 2022-11-10

## 2022-11-10 PROBLEM — D22.71 MELANOCYTIC NEVI OF RIGHT LOWER LIMB, INCLUDING HIP: Status: ACTIVE | Noted: 2022-11-10

## 2022-11-10 PROBLEM — D22.61 MELANOCYTIC NEVI OF RIGHT UPPER LIMB, INCLUDING SHOULDER: Status: ACTIVE | Noted: 2022-11-10

## 2022-11-10 PROBLEM — D22.72 MELANOCYTIC NEVI OF LEFT LOWER LIMB, INCLUDING HIP: Status: ACTIVE | Noted: 2022-11-10

## 2022-11-10 PROBLEM — D22.62 MELANOCYTIC NEVI OF LEFT UPPER LIMB, INCLUDING SHOULDER: Status: ACTIVE | Noted: 2022-11-10

## 2022-11-10 PROBLEM — D23.39 OTHER BENIGN NEOPLASM OF SKIN OF OTHER PARTS OF FACE: Status: ACTIVE | Noted: 2022-11-10

## 2022-11-10 PROBLEM — D22.5 MELANOCYTIC NEVI OF TRUNK: Status: ACTIVE | Noted: 2022-11-10

## 2022-11-10 PROCEDURE — 99203 OFFICE O/P NEW LOW 30 MIN: CPT

## 2022-11-10 PROCEDURE — ? COUNSELING

## 2022-11-10 ASSESSMENT — LOCATION SIMPLE DESCRIPTION DERM
LOCATION SIMPLE: LEFT CHEEK
LOCATION SIMPLE: RIGHT SHOULDER
LOCATION SIMPLE: UPPER BACK
LOCATION SIMPLE: RIGHT HAND
LOCATION SIMPLE: RIGHT THIGH
LOCATION SIMPLE: ABDOMEN
LOCATION SIMPLE: RIGHT LOWER BACK
LOCATION SIMPLE: LEFT FOREARM
LOCATION SIMPLE: LEFT CALF
LOCATION SIMPLE: LEFT SHOULDER
LOCATION SIMPLE: LEFT HAND
LOCATION SIMPLE: RIGHT UPPER ARM
LOCATION SIMPLE: LEFT THIGH
LOCATION SIMPLE: RIGHT FOREARM
LOCATION SIMPLE: RIGHT CHEEK
LOCATION SIMPLE: LEFT UPPER ARM
LOCATION SIMPLE: RIGHT UPPER BACK
LOCATION SIMPLE: RIGHT CALF

## 2022-11-10 ASSESSMENT — LOCATION DETAILED DESCRIPTION DERM
LOCATION DETAILED: LEFT POSTERIOR SHOULDER
LOCATION DETAILED: PERIUMBILICAL SKIN
LOCATION DETAILED: LEFT ANTERIOR DISTAL THIGH
LOCATION DETAILED: LEFT LATERAL MALAR CHEEK
LOCATION DETAILED: LEFT ANTERIOR DISTAL UPPER ARM
LOCATION DETAILED: RIGHT ULNAR DORSAL HAND
LOCATION DETAILED: LEFT RADIAL DORSAL HAND
LOCATION DETAILED: RIGHT ANTERIOR DISTAL THIGH
LOCATION DETAILED: RIGHT ANTERIOR DISTAL UPPER ARM
LOCATION DETAILED: RIGHT POSTERIOR SHOULDER
LOCATION DETAILED: EPIGASTRIC SKIN
LOCATION DETAILED: RIGHT INFERIOR MEDIAL UPPER BACK
LOCATION DETAILED: LEFT PROXIMAL POSTERIOR UPPER ARM
LOCATION DETAILED: INFERIOR THORACIC SPINE
LOCATION DETAILED: RIGHT SUPERIOR MEDIAL MIDBACK
LOCATION DETAILED: LEFT PROXIMAL DORSAL FOREARM
LOCATION DETAILED: RIGHT CENTRAL MALAR CHEEK
LOCATION DETAILED: RIGHT PROXIMAL DORSAL FOREARM
LOCATION DETAILED: LEFT VENTRAL DISTAL FOREARM
LOCATION DETAILED: LEFT PROXIMAL CALF
LOCATION DETAILED: RIGHT VENTRAL DISTAL FOREARM
LOCATION DETAILED: RIGHT PROXIMAL POSTERIOR UPPER ARM
LOCATION DETAILED: RIGHT PROXIMAL CALF

## 2022-11-10 ASSESSMENT — LOCATION ZONE DERM
LOCATION ZONE: TRUNK
LOCATION ZONE: HAND
LOCATION ZONE: ARM
LOCATION ZONE: LEG
LOCATION ZONE: FACE

## 2022-12-08 ENCOUNTER — OFFICE VISIT (OUTPATIENT)
Dept: URGENT CARE | Facility: CLINIC | Age: 42
End: 2022-12-08
Payer: COMMERCIAL

## 2022-12-08 VITALS
HEART RATE: 93 BPM | WEIGHT: 306 LBS | DIASTOLIC BLOOD PRESSURE: 72 MMHG | BODY MASS INDEX: 38.05 KG/M2 | SYSTOLIC BLOOD PRESSURE: 126 MMHG | HEIGHT: 75 IN | TEMPERATURE: 97.1 F | RESPIRATION RATE: 18 BRPM | OXYGEN SATURATION: 98 %

## 2022-12-08 DIAGNOSIS — R05.1 ACUTE COUGH: ICD-10-CM

## 2022-12-08 DIAGNOSIS — J34.89 NASAL CONGESTION WITH RHINORRHEA: ICD-10-CM

## 2022-12-08 DIAGNOSIS — U07.1 COVID: ICD-10-CM

## 2022-12-08 DIAGNOSIS — R09.81 NASAL CONGESTION WITH RHINORRHEA: ICD-10-CM

## 2022-12-08 LAB
EXTERNAL QUALITY CONTROL: ABNORMAL
FLUAV+FLUBV AG SPEC QL IA: NEGATIVE
INT CON NEG: NEGATIVE
INT CON NEG: POSITIVE
INT CON POS: NEGATIVE
INT CON POS: POSITIVE
SARS-COV+SARS-COV-2 AG RESP QL IA.RAPID: POSITIVE

## 2022-12-08 PROCEDURE — 87804 INFLUENZA ASSAY W/OPTIC: CPT | Performed by: NURSE PRACTITIONER

## 2022-12-08 PROCEDURE — 87426 SARSCOV CORONAVIRUS AG IA: CPT | Performed by: NURSE PRACTITIONER

## 2022-12-08 PROCEDURE — 99213 OFFICE O/P EST LOW 20 MIN: CPT | Mod: CS | Performed by: NURSE PRACTITIONER

## 2022-12-08 ASSESSMENT — ENCOUNTER SYMPTOMS
CHILLS: 1
ABDOMINAL PAIN: 0
EYE REDNESS: 0
DIZZINESS: 0
NECK PAIN: 0
WHEEZING: 0
FEVER: 1
SINUS PAIN: 0
CONSTIPATION: 0
WEAKNESS: 0
DIARRHEA: 0
COUGH: 1
SPUTUM PRODUCTION: 0
HEADACHES: 0
VOMITING: 0
SHORTNESS OF BREATH: 0
SORE THROAT: 0
MYALGIAS: 0
NAUSEA: 0
EYE DISCHARGE: 0

## 2022-12-08 ASSESSMENT — FIBROSIS 4 INDEX: FIB4 SCORE: 0.9

## 2022-12-08 NOTE — LETTER
December 8, 2022       Patient: Edwardo Nieves   YOB: 1980   Date of Visit: 12/8/2022         To Whom It May Concern:    In my medical opinion, I recommend that Edwardo Nieves be excused from work until Sunday December 11, 2022 as he has tested positive for COVID today in clinic.    If you have any questions or concerns, please don't hesitate to call [phone]          Sincerely,          Deb Del Castillo A.P.R.N.  Electronically Signed

## 2022-12-08 NOTE — PROGRESS NOTES
Subjective     Edwardo Nieves is a 42 y.o. male who presents with Cough (Congestion, runny nose, fever, winded x 4 days)            Cough  Associated symptoms include chills, ear pain and a fever. Pertinent negatives include no eye redness, headaches, myalgias, rash, sore throat, shortness of breath or wheezing. There is no history of environmental allergies. States has been experiencing flulike symptoms x4 days.  Nasal congestion with runny nose and cough.  History of asthma as child. Fever up to 103 last night.  Mild ear pressure.  Denies wheezing or shortness of breath with cough.  Over-the-counter medication used.  PMH:  has a past medical history of Dog bite (11/1/2019).    He has no past medical history of Diabetes (HCC), Hyperlipidemia, or Hypertension.  MEDS:   Current Outpatient Medications:     VITAMIN D PO, Take  by mouth., Disp: , Rfl:     polymixin-trimethoprim (POLYTRIM) 62574-2.1 UNIT/ML-% Solution, Administer 1 Drop into both eyes every 4 hours., Disp: 10 mL, Rfl: 0    Ginseng 100 MG Cap, Take  by mouth every day., Disp: , Rfl:     Red Yeast Rice 600 MG Cap, Take  by mouth every day., Disp: , Rfl:     Coenzyme Q10 (COQ-10) 150 MG Cap, Take 300 mg by mouth every day., Disp: , Rfl:     Multiple Vitamin (MULTI-VITAMIN PO), Take  by mouth., Disp: , Rfl:     Glucosamine-Chondroit-Vit C-Mn (GLUCOSAMINE 1500 COMPLEX) Cap, Take  by mouth., Disp: , Rfl:     vitamin E 180 mg (400 units) Cap, Take 180 mg by mouth every day., Disp: , Rfl:     NON SPECIFIED, Chrondroitin: 1200mg, Disp: , Rfl:   ALLERGIES:   Allergies   Allergen Reactions    Orange Shortness of Breath    Tobacco [Nicotiana Tabacum]      SURGHX: History reviewed. No pertinent surgical history.  SOCHX:  reports that he has never smoked. He has never used smokeless tobacco. He reports current alcohol use. He reports that he does not use drugs.  FH: Family history was reviewed, no pertinent findings to report      Review of Systems   Constitutional:   "Positive for chills, fever and malaise/fatigue.   HENT:  Positive for congestion and ear pain. Negative for sinus pain and sore throat.    Eyes:  Negative for discharge and redness.   Respiratory:  Positive for cough. Negative for sputum production, shortness of breath and wheezing.    Gastrointestinal:  Negative for abdominal pain, constipation, diarrhea, nausea and vomiting.   Musculoskeletal:  Negative for myalgias and neck pain.   Skin:  Negative for itching and rash.   Neurological:  Negative for dizziness, weakness and headaches.   Endo/Heme/Allergies:  Negative for environmental allergies.   All other systems reviewed and are negative.           Objective     /72 (BP Location: Left arm, Patient Position: Sitting, BP Cuff Size: Adult)   Pulse 93   Temp 36.2 °C (97.1 °F) (Temporal)   Resp 18   Ht 1.905 m (6' 3\")   Wt (!) 139 kg (306 lb)   SpO2 98%   BMI 38.25 kg/m²      Physical Exam  Vitals reviewed.   Constitutional:       General: He is awake. He is not in acute distress.     Appearance: Normal appearance. He is well-developed. He is not ill-appearing, toxic-appearing or diaphoretic.   HENT:      Head: Normocephalic.      Right Ear: Ear canal and external ear normal. A middle ear effusion is present.      Left Ear: Ear canal and external ear normal. A middle ear effusion is present.      Nose: Congestion and rhinorrhea present.      Mouth/Throat:      Lips: Pink.      Mouth: Mucous membranes are dry.      Pharynx: Oropharynx is clear. Uvula midline.   Eyes:      Conjunctiva/sclera: Conjunctivae normal.   Cardiovascular:      Rate and Rhythm: Normal rate.   Pulmonary:      Effort: Pulmonary effort is normal.      Breath sounds: Normal breath sounds and air entry. No stridor, decreased air movement or transmitted upper airway sounds. No decreased breath sounds, wheezing, rhonchi or rales.      Comments: Dry cough heard on exam  Musculoskeletal:      Cervical back: Normal range of motion and neck " supple.   Neurological:      Mental Status: He is alert and oriented to person, place, and time.   Psychiatric:         Attention and Perception: Attention normal.         Mood and Affect: Mood normal.         Speech: Speech normal.         Behavior: Behavior normal. Behavior is cooperative.                           Assessment & Plan        1. COVID  -declines antiviral at this time    2. Acute cough    - POCT SARS-COV Antigen MAHESH (Symptomatic only)  - POCT Influenza A/B    3. Nasal congestion with rhinorrhea    - POCT SARS-COV Antigen MAHESH (Symptomatic only)    -Stay home isolated from others until COVID test resulted the follow CDC guidelines for positive cases as discussed  -Maintain hydration/water intake  -May use over the counter Tylenol/Ibuprofen as needed for fever or body aches  -Get rest  -Salt water gargle as needed for any sore throat  -May use over the counter Flonase, saline nasal spray as needed for any nasal congestion  -May use over the counter cough suppressant medications like plain Robitussin/Delsym as needed   -May take over the counter Imodium as needed for diarrhea  -Monitor for fevers, cough, shortness of breath, chest pain, chest tightness, lethargy- need re-evaluation

## 2022-12-15 ENCOUNTER — OFFICE VISIT (OUTPATIENT)
Dept: MEDICAL GROUP | Facility: LAB | Age: 42
End: 2022-12-15
Payer: COMMERCIAL

## 2022-12-15 VITALS
BODY MASS INDEX: 39.17 KG/M2 | RESPIRATION RATE: 14 BRPM | DIASTOLIC BLOOD PRESSURE: 60 MMHG | HEIGHT: 75 IN | SYSTOLIC BLOOD PRESSURE: 92 MMHG | HEART RATE: 86 BPM | OXYGEN SATURATION: 99 % | TEMPERATURE: 96.9 F | WEIGHT: 315 LBS

## 2022-12-15 DIAGNOSIS — U07.1 COVID-19: ICD-10-CM

## 2022-12-15 DIAGNOSIS — Z23 NEED FOR VACCINATION: ICD-10-CM

## 2022-12-15 PROCEDURE — 99213 OFFICE O/P EST LOW 20 MIN: CPT | Mod: 25 | Performed by: FAMILY MEDICINE

## 2022-12-15 PROCEDURE — 90686 IIV4 VACC NO PRSV 0.5 ML IM: CPT | Performed by: INTERNAL MEDICINE

## 2022-12-15 PROCEDURE — 90471 IMMUNIZATION ADMIN: CPT | Performed by: INTERNAL MEDICINE

## 2022-12-15 ASSESSMENT — ENCOUNTER SYMPTOMS
FEVER: 0
DIARRHEA: 0
VOMITING: 0
CONSTIPATION: 0
BLURRED VISION: 0
CHILLS: 0
NAUSEA: 0
ABDOMINAL PAIN: 0

## 2022-12-15 ASSESSMENT — FIBROSIS 4 INDEX: FIB4 SCORE: 0.9

## 2025-02-07 ENCOUNTER — OFFICE VISIT (OUTPATIENT)
Dept: MEDICAL GROUP | Facility: LAB | Age: 45
End: 2025-02-07
Payer: COMMERCIAL

## 2025-02-07 ENCOUNTER — HOSPITAL ENCOUNTER (OUTPATIENT)
Dept: LAB | Facility: MEDICAL CENTER | Age: 45
End: 2025-02-07
Attending: FAMILY MEDICINE
Payer: COMMERCIAL

## 2025-02-07 VITALS
OXYGEN SATURATION: 98 % | SYSTOLIC BLOOD PRESSURE: 114 MMHG | WEIGHT: 285 LBS | HEART RATE: 90 BPM | TEMPERATURE: 96.9 F | RESPIRATION RATE: 18 BRPM | BODY MASS INDEX: 36.57 KG/M2 | DIASTOLIC BLOOD PRESSURE: 78 MMHG | HEIGHT: 74 IN

## 2025-02-07 DIAGNOSIS — Z23 NEED FOR VACCINATION: ICD-10-CM

## 2025-02-07 DIAGNOSIS — Z11.59 NEED FOR HEPATITIS C SCREENING TEST: ICD-10-CM

## 2025-02-07 DIAGNOSIS — E66.9 OBESITY (BMI 35.0-39.9 WITHOUT COMORBIDITY): ICD-10-CM

## 2025-02-07 DIAGNOSIS — B00.9 HERPES SIMPLEX: ICD-10-CM

## 2025-02-07 DIAGNOSIS — Z00.00 ANNUAL PHYSICAL EXAM: ICD-10-CM

## 2025-02-07 DIAGNOSIS — Z13.6 SCREENING FOR CARDIOVASCULAR CONDITION: ICD-10-CM

## 2025-02-07 DIAGNOSIS — Z11.4 SCREENING FOR HIV (HUMAN IMMUNODEFICIENCY VIRUS): ICD-10-CM

## 2025-02-07 LAB
ALBUMIN SERPL BCP-MCNC: 4.3 G/DL (ref 3.2–4.9)
ALBUMIN/GLOB SERPL: 1.6 G/DL
ALP SERPL-CCNC: 67 U/L (ref 30–99)
ALT SERPL-CCNC: 21 U/L (ref 2–50)
ANION GAP SERPL CALC-SCNC: 10 MMOL/L (ref 7–16)
AST SERPL-CCNC: 28 U/L (ref 12–45)
BILIRUB SERPL-MCNC: 0.4 MG/DL (ref 0.1–1.5)
BUN SERPL-MCNC: 17 MG/DL (ref 8–22)
CALCIUM ALBUM COR SERPL-MCNC: 9 MG/DL (ref 8.5–10.5)
CALCIUM SERPL-MCNC: 9.2 MG/DL (ref 8.5–10.5)
CHLORIDE SERPL-SCNC: 107 MMOL/L (ref 96–112)
CHOLEST SERPL-MCNC: 151 MG/DL (ref 100–199)
CO2 SERPL-SCNC: 23 MMOL/L (ref 20–33)
CREAT SERPL-MCNC: 1.18 MG/DL (ref 0.5–1.4)
ERYTHROCYTE [DISTWIDTH] IN BLOOD BY AUTOMATED COUNT: 49 FL (ref 35.9–50)
GFR SERPLBLD CREATININE-BSD FMLA CKD-EPI: 78 ML/MIN/1.73 M 2
GLOBULIN SER CALC-MCNC: 2.7 G/DL (ref 1.9–3.5)
GLUCOSE SERPL-MCNC: 94 MG/DL (ref 65–99)
HCT VFR BLD AUTO: 45.3 % (ref 42–52)
HCV AB SER QL: NORMAL
HDLC SERPL-MCNC: 48 MG/DL
HGB BLD-MCNC: 14.1 G/DL (ref 14–18)
HIV 1+2 AB+HIV1 P24 AG SERPL QL IA: NORMAL
LDLC SERPL CALC-MCNC: 90 MG/DL
MCH RBC QN AUTO: 28.5 PG (ref 27–33)
MCHC RBC AUTO-ENTMCNC: 31.1 G/DL (ref 32.3–36.5)
MCV RBC AUTO: 91.7 FL (ref 81.4–97.8)
PLATELET # BLD AUTO: 245 K/UL (ref 164–446)
PMV BLD AUTO: 10.6 FL (ref 9–12.9)
POTASSIUM SERPL-SCNC: 4.4 MMOL/L (ref 3.6–5.5)
PROT SERPL-MCNC: 7 G/DL (ref 6–8.2)
RBC # BLD AUTO: 4.94 M/UL (ref 4.7–6.1)
SODIUM SERPL-SCNC: 140 MMOL/L (ref 135–145)
TRIGL SERPL-MCNC: 67 MG/DL (ref 0–149)
WBC # BLD AUTO: 6.5 K/UL (ref 4.8–10.8)

## 2025-02-07 PROCEDURE — 3074F SYST BP LT 130 MM HG: CPT | Performed by: FAMILY MEDICINE

## 2025-02-07 PROCEDURE — 87389 HIV-1 AG W/HIV-1&-2 AB AG IA: CPT

## 2025-02-07 PROCEDURE — 90471 IMMUNIZATION ADMIN: CPT

## 2025-02-07 PROCEDURE — 99396 PREV VISIT EST AGE 40-64: CPT | Mod: 25 | Performed by: FAMILY MEDICINE

## 2025-02-07 PROCEDURE — 80061 LIPID PANEL: CPT

## 2025-02-07 PROCEDURE — 36415 COLL VENOUS BLD VENIPUNCTURE: CPT

## 2025-02-07 PROCEDURE — 90656 IIV3 VACC NO PRSV 0.5 ML IM: CPT

## 2025-02-07 PROCEDURE — 80053 COMPREHEN METABOLIC PANEL: CPT

## 2025-02-07 PROCEDURE — 86803 HEPATITIS C AB TEST: CPT

## 2025-02-07 PROCEDURE — 3078F DIAST BP <80 MM HG: CPT | Performed by: FAMILY MEDICINE

## 2025-02-07 PROCEDURE — 85027 COMPLETE CBC AUTOMATED: CPT

## 2025-02-07 RX ORDER — VALACYCLOVIR HYDROCHLORIDE 1 G/1
1000 TABLET, FILM COATED ORAL 2 TIMES DAILY
Qty: 60 TABLET | Refills: 3 | Status: SHIPPED | OUTPATIENT
Start: 2025-02-07 | End: 2025-03-09

## 2025-02-07 ASSESSMENT — PATIENT HEALTH QUESTIONNAIRE - PHQ9
7. TROUBLE CONCENTRATING ON THINGS, SUCH AS READING THE NEWSPAPER OR WATCHING TELEVISION: NOT AT ALL
3. TROUBLE FALLING OR STAYING ASLEEP OR SLEEPING TOO MUCH: MORE THAN HALF THE DAYS
9. THOUGHTS THAT YOU WOULD BE BETTER OFF DEAD, OR OF HURTING YOURSELF: NOT AT ALL
6. FEELING BAD ABOUT YOURSELF - OR THAT YOU ARE A FAILURE OR HAVE LET YOURSELF OR YOUR FAMILY DOWN: MORE THAN HALF THE DAYS
2. FEELING DOWN, DEPRESSED, IRRITABLE, OR HOPELESS: SEVERAL DAYS
5. POOR APPETITE OR OVEREATING: NOT AT ALL
4. FEELING TIRED OR HAVING LITTLE ENERGY: NOT AT ALL
1. LITTLE INTEREST OR PLEASURE IN DOING THINGS: SEVERAL DAYS
SUM OF ALL RESPONSES TO PHQ9 QUESTIONS 1 AND 2: 2
SUM OF ALL RESPONSES TO PHQ QUESTIONS 1-9: 6
8. MOVING OR SPEAKING SO SLOWLY THAT OTHER PEOPLE COULD HAVE NOTICED. OR THE OPPOSITE, BEING SO FIGETY OR RESTLESS THAT YOU HAVE BEEN MOVING AROUND A LOT MORE THAN USUAL: NOT AT ALL

## 2025-02-07 ASSESSMENT — ENCOUNTER SYMPTOMS
CONSTIPATION: 0
SHORTNESS OF BREATH: 0
NERVOUS/ANXIOUS: 0
BLURRED VISION: 0
DIARRHEA: 0
DEPRESSION: 0
WHEEZING: 0
ABDOMINAL PAIN: 0
DOUBLE VISION: 0

## 2025-02-07 NOTE — PROGRESS NOTES
Verbal consent was acquired by the patient to use [employer] ambient listening note generation during this visit Yes    Subjective:   Edwardo Nieves is a 44 y.o. male here today for   Chief Complaint   Patient presents with    Medication Refill     History of Present Illness  The patient is a 44-year-old male who presents today for a refill of Valtrex. He has a history of herpes simplex, managed with as-needed abortive therapy. Weight and plan for weight loss was also discussed.    He reports that his last prescription of Valtrex was used up approximately a month ago, with the previous prescription lasting at least 4 months. He continues to use [facility] pharmacy on [address].    He has been experiencing renal discomfort, which he attributes to his consumption of pre-workout supplements as a substitute for caffeine during a period of insomnia. He recalls an incident in August where he inadvertently left the supplements in his car, resulting in them fusing together due to moisture accumulation. Despite this, he consumed one of the supplements and experienced pain approximately 4.5 hours later. Since then, he has not had any similar episodes. He describes the pain as being located in the middle of his back, akin to being punched on each side. He reports no urinary symptoms such as dysuria. He has observed that excessive intake of processed sugar exacerbates his renal discomfort, likening it to being punched in the kidneys.    He has made dietary modifications, including the incorporation of dark greens and spinach, and has initiated a fitness regimen. His diet is low in carbohydrates, with minimal intake of refined sugars, and includes high protein sources, fruits, and salads. He has also reduced his alcohol consumption, limiting himself to one cocktail every few weeks. He reports no tobacco use or other drug use. He maintains regular dental check-ups and reports no new sexual partners or concerns for sexually transmitted  diseases. He experiences shortness of breath post-exercise, which he considers normal, and reports no palpitations. He reports no chronic gastrointestinal symptoms such as abdominal pain, diarrhea, or constipation. He reports no concerns related to depression or anxiety and notes a recent improvement in his depressive symptoms.    Supplemental Information  He has been experiencing insomnia since the age of 14. He reports no issues with his hearing or vision, although he acknowledges the need for new glasses.    SOCIAL HISTORY  He reports no smoking, alcohol, and drug use.    FAMILY HISTORY  There is a history of alcoholism in his family.    ALLERGIES  He is allergic to TOBACCO.    MEDICATIONS  Valtrex      Allergies   Allergen Reactions    Orange Shortness of Breath    Silver Hives and Itching    Tobacco [Nicotiana Tabacum]          Current medicines (including changes today)  Current Outpatient Medications   Medication Sig Dispense Refill    VITAMIN D PO Take  by mouth.      Ginseng 100 MG Cap Take  by mouth every day.      Red Yeast Rice 600 MG Cap Take  by mouth every day.      Coenzyme Q10 (COQ-10) 150 MG Cap Take 300 mg by mouth every day.      Multiple Vitamin (MULTI-VITAMIN PO) Take  by mouth.      Glucosamine-Chondroit-Vit C-Mn (GLUCOSAMINE 1500 COMPLEX) Cap Take  by mouth.      vitamin E 180 mg (400 units) Cap Take 180 mg by mouth every day.      polymixin-trimethoprim (POLYTRIM) 05037-0.1 UNIT/ML-% Solution Administer 1 Drop into both eyes every 4 hours. 10 mL 0     No current facility-administered medications for this visit.     He  has a past medical history of Dog bite (11/1/2019).    He has no past medical history of Diabetes (HCC), Hyperlipidemia, or Hypertension.    ROS   Review of Systems   HENT:  Negative for hearing loss.    Eyes:  Negative for blurred vision and double vision.   Respiratory:  Negative for shortness of breath and wheezing.    Cardiovascular:  Negative for chest pain.  "  Gastrointestinal:  Negative for abdominal pain, constipation and diarrhea.   Psychiatric/Behavioral:  Negative for depression. The patient is not nervous/anxious.    -See HPI     Objective:     Physical Exam:  /78   Pulse 90   Temp 36.1 °C (96.9 °F) (Temporal)   Resp 18   Ht 1.88 m (6' 2\")   Wt (!) 129 kg (285 lb)   SpO2 98%  Body mass index is 36.59 kg/m².   Constitutional: Alert, no distress.  Skin: Warm, dry, good turgor, no rashes in visible areas.  Eye: Equal, round and reactive, conjunctiva clear, lids normal.  ENMT: TM's clear bilaterally, lips without lesions, good dentition, oropharynx clear.  Neck: Trachea midline, no masses, no thyromegaly. No cervical or supraclavicular lymphadenopathy.  Respiratory: Unlabored respiratory effort, lungs clear to auscultation, no wheezes, no rhonchi.  Cardiovascular: Normal S1, S2, no murmur, no edema.  Abdomen: Soft, non-tender, no masses, no hepatosplenomegaly.  Psych: Alert and oriented x3, normal affect and mood.    Results      Assessment and Plan:     Assessment & Plan  1. Herpes Simplex.  He has a history of herpes simplex and uses Valtrex as needed for abortive therapy. A prescription refill for Valtrex has been provided. He is advised to continue using Valtrex on an as-needed basis and to report any increase in frequency or severity of symptoms. If symptoms worsen, a transition to daily therapy may be considered.    2. Renal Discomfort.  He reports experiencing pain in the middle of his back, which he associates with the consumption of processed sugar. Differential diagnoses include bladder issues or kidney stones. He is advised to maintain adequate hydration and adhere to a low-sodium diet, avoiding processed foods. He is also encouraged to continue his current lifestyle modifications, including dietary changes and exercise. Fasting blood work will be conducted to assess kidney function, blood glucose levels, and cholesterol levels. Screening for " HIV and hepatitis C will also be performed. If there are any concerning findings in the lab results, he will be notified.    3. Annual  -All questions concerns were answered at this time.  -Vaccinations/screenings needed at this time: We will complete influenza vaccine today.  -Labs reviewed, no concerns.  Will need to recheck labs at this time including CBC, CMP, hemoglobin A1c, lipid profile.  Patient also due for hepatitis C and HIV screening.  -Discussed the importance of a healthy, Mediterranean style diet, routine exercise regimen.  -Discussed general safety measures including seatbelts, helmets, avoidance of smoking, sunscreen, hydration.  -Follow-up for general physical exam on a yearly basis, sooner if needed.      Follow-up  The patient will follow up in a year.    Orders:  1. Annual physical exam  - CBC WITHOUT DIFFERENTIAL; Future  - Comp Metabolic Panel; Future    2. Herpes simplex  - valacyclovir (VALTREX) 1 GM Tab; Take 1 Tablet by mouth 2 times a day for 30 days.  Dispense: 60 Tablet; Refill: 3    3. Obesity (BMI 35.0-39.9 without comorbidity) (HCC)    4. Screening for cardiovascular condition  - Lipid Profile; Future    5. Screening for HIV (human immunodeficiency virus)  - HIV AG/AB COMBO ASSAY SCREENING; Future    6. Need for hepatitis C screening test  - HEP C VIRUS ANTIBODY; Future    7. Need for vaccination  - INFLUENZA VACCINE TRI INJ (PF)        Followup: No follow-ups on file.         PLEASE NOTE: This dictation was created using voice recognition and [employer] ambient listening software. I have made every reasonable attempt to correct obvious errors, but I expect that there are errors of grammar and possibly content that I did not discover before finalizing the note.

## 2025-05-16 ENCOUNTER — OFFICE VISIT (OUTPATIENT)
Dept: URGENT CARE | Facility: CLINIC | Age: 45
End: 2025-05-16
Payer: COMMERCIAL

## 2025-05-16 VITALS
HEART RATE: 71 BPM | RESPIRATION RATE: 16 BRPM | BODY MASS INDEX: 36.8 KG/M2 | HEIGHT: 75 IN | SYSTOLIC BLOOD PRESSURE: 128 MMHG | OXYGEN SATURATION: 99 % | TEMPERATURE: 97.7 F | DIASTOLIC BLOOD PRESSURE: 80 MMHG | WEIGHT: 296 LBS

## 2025-05-16 DIAGNOSIS — L08.9 RIGHT FOOT INFECTION: Primary | ICD-10-CM

## 2025-05-16 PROCEDURE — 99214 OFFICE O/P EST MOD 30 MIN: CPT | Mod: 25 | Performed by: PHYSICIAN ASSISTANT

## 2025-05-16 PROCEDURE — 90471 IMMUNIZATION ADMIN: CPT | Performed by: PHYSICIAN ASSISTANT

## 2025-05-16 PROCEDURE — 3074F SYST BP LT 130 MM HG: CPT | Performed by: PHYSICIAN ASSISTANT

## 2025-05-16 PROCEDURE — 3079F DIAST BP 80-89 MM HG: CPT | Performed by: PHYSICIAN ASSISTANT

## 2025-05-16 PROCEDURE — 90715 TDAP VACCINE 7 YRS/> IM: CPT | Performed by: PHYSICIAN ASSISTANT

## 2025-05-16 ASSESSMENT — ENCOUNTER SYMPTOMS
FEVER: 0
CHILLS: 0

## 2025-05-16 ASSESSMENT — FIBROSIS 4 INDEX: FIB4 SCORE: 1.07

## 2025-05-16 NOTE — PROGRESS NOTES
"  Subjective:   Edwardo Nieves is a 44 y.o. male who presents today with   Chief Complaint   Patient presents with    Foot Swelling     X 7 days, swelling redness of toes and ball of foot.     Foot Swelling  This is a new problem. The current episode started in the past 7 days. Pertinent negatives include no chills or fever. He has tried nothing for the symptoms. The treatment provided no relief.     Patient denies any recent injury or trauma.  States over the last week has had some swelling and some redness near the ball of his foot.  He states he does walk barefoot occasionally but does not remember specifically stepping on something.    PMH:  has a past medical history of Dog bite (11/1/2019).    He has no past medical history of Diabetes (HCC), Hyperlipidemia, or Hypertension.  MEDS: Current Medications[1]  ALLERGIES: Allergies[2]  SURGHX: Past Surgical History[3]  SOCHX:  reports that he has never smoked. He has never used smokeless tobacco. He reports current alcohol use. He reports that he does not use drugs.  FH: Reviewed with patient, not pertinent to this visit.     Review of Systems   Constitutional:  Negative for chills and fever.   Musculoskeletal:         Right foot swelling, pain      Objective:   /80   Pulse 71   Temp 36.5 °C (97.7 °F) (Temporal)   Resp 16   Ht 1.905 m (6' 3\")   Wt (!) 134 kg (296 lb)   SpO2 99%   BMI 37.00 kg/m²   Physical Exam  Vitals and nursing note reviewed.   Constitutional:       General: He is not in acute distress.     Appearance: Normal appearance. He is well-developed. He is not ill-appearing or toxic-appearing.   HENT:      Head: Normocephalic and atraumatic.      Right Ear: Hearing normal.      Left Ear: Hearing normal.   Cardiovascular:      Rate and Rhythm: Normal rate.   Pulmonary:      Effort: Pulmonary effort is normal.   Musculoskeletal:        Feet:       Comments: Patient has full range of motion of the digits of the right foot.  Neurovascular " intact distally.  Full plantarflexion and dorsiflexion.  No bony tenderness to palpation.  Between the 2nd and 3rd digits of the right foot there is a dark superficial foreign body with flaking skin.  There is no open wound or drainage or discharge.  No induration or fluctuance.  Slight redness to the ball of the foot.  Mild tenderness to the soft tissue of the ball of the right foot.   Skin:     General: Skin is warm and dry.   Neurological:      Mental Status: He is alert.      Coordination: Coordination normal.   Psychiatric:         Mood and Affect: Mood normal.       Assessment/Plan:   Assessment    1. Right foot infection  - Tdap =>8yo IM  - amoxicillin-clavulanate (AUGMENTIN) 875-125 MG Tab; Take 1 Tablet by mouth 2 times a day for 7 days.  Dispense: 14 Tablet; Refill: 0    Verbal consent obtained from patient and I was able to clean the area with chlorhexidine and used sterile splinter forceps to remove foreign body which looked like possibly some wood or dirt as well as some sand underneath a blister.  Debrided the peeling skin and again there is no open wound or further foreign body.  Cleaned the area again further.  Normal healing skin underneath the peeling blistered skin in the area.  No necrosis.    Differential could also include Mina's neuroma.  No signs of abscess or any indication of I&D today.  No indication for x-ray today although I did offer and patient declined and states he will monitor and return if symptoms persist in the next 1 to 2 weeks.    Please note that this dictation was created using voice recognition software. I have made every reasonable attempt to correct obvious errors, but I expect that there are errors of grammar and possibly content that I did not discover before finalizing the note.    Cameron Awad PA-C         [1]   Current Outpatient Medications:     amoxicillin-clavulanate (AUGMENTIN) 875-125 MG Tab, Take 1 Tablet by mouth 2 times a day for 7 days., Disp: 14 Tablet,  Rfl: 0    VITAMIN D PO, Take  by mouth., Disp: , Rfl:     Ginseng 100 MG Cap, Take  by mouth every day., Disp: , Rfl:     Red Yeast Rice 600 MG Cap, Take  by mouth every day., Disp: , Rfl:     Coenzyme Q10 (COQ-10) 150 MG Cap, Take 300 mg by mouth every day., Disp: , Rfl:     Multiple Vitamin (MULTI-VITAMIN PO), Take  by mouth., Disp: , Rfl:     Glucosamine-Chondroit-Vit C-Mn (GLUCOSAMINE 1500 COMPLEX) Cap, Take  by mouth., Disp: , Rfl:     vitamin E 180 mg (400 units) Cap, Take 180 mg by mouth every day., Disp: , Rfl:   [2]   Allergies  Allergen Reactions    Orange Shortness of Breath    Silver Hives and Itching    Tobacco [Nicotiana Tabacum]    [3] History reviewed. No pertinent surgical history.